# Patient Record
Sex: MALE | Race: BLACK OR AFRICAN AMERICAN | NOT HISPANIC OR LATINO | URBAN - METROPOLITAN AREA
[De-identification: names, ages, dates, MRNs, and addresses within clinical notes are randomized per-mention and may not be internally consistent; named-entity substitution may affect disease eponyms.]

---

## 2017-09-09 ENCOUNTER — EMERGENCY (EMERGENCY)
Facility: HOSPITAL | Age: 67
LOS: 0 days | Discharge: HOME | End: 2017-09-10
Attending: EMERGENCY MEDICINE
Payer: MEDICARE

## 2017-09-09 VITALS
WEIGHT: 198.42 LBS | DIASTOLIC BLOOD PRESSURE: 81 MMHG | TEMPERATURE: 98 F | RESPIRATION RATE: 20 BRPM | SYSTOLIC BLOOD PRESSURE: 129 MMHG | OXYGEN SATURATION: 97 % | HEART RATE: 18 BPM

## 2017-09-09 DIAGNOSIS — R06.02 SHORTNESS OF BREATH: ICD-10-CM

## 2017-09-09 DIAGNOSIS — R51 HEADACHE: ICD-10-CM

## 2017-09-09 DIAGNOSIS — I10 ESSENTIAL (PRIMARY) HYPERTENSION: ICD-10-CM

## 2017-09-09 DIAGNOSIS — R07.9 CHEST PAIN, UNSPECIFIED: ICD-10-CM

## 2017-09-09 LAB
ALBUMIN SERPL ELPH-MCNC: 3.7 G/DL — SIGNIFICANT CHANGE UP (ref 3.3–5)
ALP SERPL-CCNC: 52 U/L — SIGNIFICANT CHANGE UP (ref 40–120)
ALT FLD-CCNC: 23 U/L — SIGNIFICANT CHANGE UP (ref 12–78)
ANION GAP SERPL CALC-SCNC: 8 MMOL/L — SIGNIFICANT CHANGE UP (ref 5–17)
APPEARANCE UR: CLEAR — SIGNIFICANT CHANGE UP
APTT BLD: 30.4 SEC — SIGNIFICANT CHANGE UP (ref 27.5–37.4)
AST SERPL-CCNC: 13 U/L — LOW (ref 15–37)
BASOPHILS # BLD AUTO: 0.1 K/UL — SIGNIFICANT CHANGE UP (ref 0–0.2)
BASOPHILS NFR BLD AUTO: 1.1 % — SIGNIFICANT CHANGE UP (ref 0–2)
BILIRUB SERPL-MCNC: 0.4 MG/DL — SIGNIFICANT CHANGE UP (ref 0.2–1.2)
BILIRUB UR-MCNC: NEGATIVE — SIGNIFICANT CHANGE UP
BUN SERPL-MCNC: 17 MG/DL — SIGNIFICANT CHANGE UP (ref 7–23)
CALCIUM SERPL-MCNC: 8.8 MG/DL — SIGNIFICANT CHANGE UP (ref 8.5–10.1)
CHLORIDE SERPL-SCNC: 104 MMOL/L — SIGNIFICANT CHANGE UP (ref 96–108)
CK MB BLD-MCNC: 0.3 % — SIGNIFICANT CHANGE UP (ref 0–3.5)
CK MB BLD-MCNC: <0.3 % — SIGNIFICANT CHANGE UP (ref 0–3.5)
CK MB CFR SERPL CALC: 0.5 NG/ML — SIGNIFICANT CHANGE UP (ref 0.5–3.6)
CK MB CFR SERPL CALC: <0.5 NG/ML — SIGNIFICANT CHANGE UP (ref 0.5–3.6)
CK SERPL-CCNC: 149 U/L — SIGNIFICANT CHANGE UP (ref 26–308)
CK SERPL-CCNC: 163 U/L — SIGNIFICANT CHANGE UP (ref 26–308)
CO2 SERPL-SCNC: 30 MMOL/L — SIGNIFICANT CHANGE UP (ref 22–31)
COLOR SPEC: YELLOW — SIGNIFICANT CHANGE UP
CREAT SERPL-MCNC: 1.44 MG/DL — HIGH (ref 0.5–1.3)
D DIMER BLD IA.RAPID-MCNC: 275 NG/ML DDU — HIGH
DIFF PNL FLD: NEGATIVE — SIGNIFICANT CHANGE UP
EOSINOPHIL # BLD AUTO: 0 K/UL — SIGNIFICANT CHANGE UP (ref 0–0.5)
EOSINOPHIL NFR BLD AUTO: 0.2 % — SIGNIFICANT CHANGE UP (ref 0–6)
GLUCOSE SERPL-MCNC: 144 MG/DL — HIGH (ref 70–99)
GLUCOSE UR QL: 50 MG/DL
HCT VFR BLD CALC: 45.4 % — SIGNIFICANT CHANGE UP (ref 39–50)
HGB BLD-MCNC: 15.5 G/DL — SIGNIFICANT CHANGE UP (ref 13–17)
INR BLD: 0.88 RATIO — SIGNIFICANT CHANGE UP (ref 0.88–1.16)
KETONES UR-MCNC: NEGATIVE — SIGNIFICANT CHANGE UP
LEUKOCYTE ESTERASE UR-ACNC: NEGATIVE — SIGNIFICANT CHANGE UP
LYMPHOCYTES # BLD AUTO: 0.7 K/UL — LOW (ref 1–3.3)
LYMPHOCYTES # BLD AUTO: 12.8 % — LOW (ref 13–44)
MCHC RBC-ENTMCNC: 32.8 PG — SIGNIFICANT CHANGE UP (ref 27–34)
MCHC RBC-ENTMCNC: 34.1 GM/DL — SIGNIFICANT CHANGE UP (ref 32–36)
MCV RBC AUTO: 96.2 FL — SIGNIFICANT CHANGE UP (ref 80–100)
MONOCYTES # BLD AUTO: 0.3 K/UL — SIGNIFICANT CHANGE UP (ref 0–0.9)
MONOCYTES NFR BLD AUTO: 5.7 % — SIGNIFICANT CHANGE UP (ref 2–14)
NEUTROPHILS # BLD AUTO: 4.6 K/UL — SIGNIFICANT CHANGE UP (ref 1.8–7.4)
NEUTROPHILS NFR BLD AUTO: 80.1 % — HIGH (ref 43–77)
NITRITE UR-MCNC: NEGATIVE — SIGNIFICANT CHANGE UP
PH UR: 6 — SIGNIFICANT CHANGE UP (ref 5–8)
PLATELET # BLD AUTO: 222 K/UL — SIGNIFICANT CHANGE UP (ref 150–400)
POTASSIUM SERPL-MCNC: 3.5 MMOL/L — SIGNIFICANT CHANGE UP (ref 3.5–5.3)
POTASSIUM SERPL-SCNC: 3.5 MMOL/L — SIGNIFICANT CHANGE UP (ref 3.5–5.3)
PROT SERPL-MCNC: 7.7 GM/DL — SIGNIFICANT CHANGE UP (ref 6–8.3)
PROT UR-MCNC: NEGATIVE MG/DL — SIGNIFICANT CHANGE UP
PROTHROM AB SERPL-ACNC: 9.6 SEC — LOW (ref 9.8–12.7)
RBC # BLD: 4.72 M/UL — SIGNIFICANT CHANGE UP (ref 4.2–5.8)
RBC # FLD: 13.1 % — SIGNIFICANT CHANGE UP (ref 11–15)
SODIUM SERPL-SCNC: 142 MMOL/L — SIGNIFICANT CHANGE UP (ref 135–145)
SP GR SPEC: 1.01 — SIGNIFICANT CHANGE UP (ref 1.01–1.02)
TROPONIN I SERPL-MCNC: <.015 NG/ML — SIGNIFICANT CHANGE UP (ref 0.01–0.04)
TROPONIN I SERPL-MCNC: <.015 NG/ML — SIGNIFICANT CHANGE UP (ref 0.01–0.04)
TSH SERPL-MCNC: 0.45 UIU/ML — SIGNIFICANT CHANGE UP (ref 0.36–3.74)
UROBILINOGEN FLD QL: NEGATIVE MG/DL — SIGNIFICANT CHANGE UP
WBC # BLD: 5.8 K/UL — SIGNIFICANT CHANGE UP (ref 3.8–10.5)
WBC # FLD AUTO: 5.8 K/UL — SIGNIFICANT CHANGE UP (ref 3.8–10.5)

## 2017-09-09 PROCEDURE — 70450 CT HEAD/BRAIN W/O DYE: CPT | Mod: 26

## 2017-09-09 PROCEDURE — 99285 EMERGENCY DEPT VISIT HI MDM: CPT

## 2017-09-09 PROCEDURE — 71275 CT ANGIOGRAPHY CHEST: CPT | Mod: 26

## 2017-09-09 PROCEDURE — 71010: CPT | Mod: 26

## 2017-09-09 RX ORDER — SODIUM CHLORIDE 9 MG/ML
3 INJECTION INTRAMUSCULAR; INTRAVENOUS; SUBCUTANEOUS ONCE
Qty: 0 | Refills: 0 | Status: COMPLETED | OUTPATIENT
Start: 2017-09-09 | End: 2017-09-09

## 2017-09-09 RX ORDER — SODIUM CHLORIDE 9 MG/ML
1000 INJECTION INTRAMUSCULAR; INTRAVENOUS; SUBCUTANEOUS ONCE
Qty: 0 | Refills: 0 | Status: COMPLETED | OUTPATIENT
Start: 2017-09-09 | End: 2017-09-09

## 2017-09-09 RX ADMIN — SODIUM CHLORIDE 1000 MILLILITER(S): 9 INJECTION INTRAMUSCULAR; INTRAVENOUS; SUBCUTANEOUS at 19:20

## 2017-09-09 RX ADMIN — SODIUM CHLORIDE 3 MILLILITER(S): 9 INJECTION INTRAMUSCULAR; INTRAVENOUS; SUBCUTANEOUS at 16:46

## 2017-09-09 NOTE — ED PROVIDER NOTE - MEDICAL DECISION MAKING DETAILS
Pending repeat CE, TSH. Patient signed out to incoming physician.  All decisions regarding the progression of care will be made at their discretion.

## 2017-09-09 NOTE — ED PROVIDER NOTE - PROGRESS NOTE DETAILS
pt now a lot more alert and oriented,s tates he didn't sleep. denies cp now. Pt AOx4. hungry. well appearing and neurologically intact. Pt endorsed by Dr. Shirley, present for eval of CP.  Pending CTA & repeat CE.  Pt VSS in NAD awaiting tests. CE & CTA WNL.  Discussed results and outcome of testing with the patient, given copy as well.  Patient advised to please follow up with their primary care doctor within the next 24 hours and return to the Emergency Department for worsening symptoms or any other concerns.  Patient advised that their doctor may call  to follow up on the specific results of the tests performed today in the emergency department.

## 2017-09-09 NOTE — ED PROVIDER NOTE - PHYSICAL EXAMINATION
Gen: Alert, Well appearing. NAD    Head: NC, AT, PERRL, EOMI, normal lids/conjunctiva   ENT: Bilateral TM WNL, normal hearing, patent oropharynx without erythema/exudate, uvula midline  Neck: supple, no tenderness/meningismus/JVD   Pulm: Bilateral clear BS, normal resp effort, no wheeze/stridor/retractions  CV: RRR, no M/R/G, +dist pulses   Abd: soft, NT/ND, +BS, no guarding/rebound tenderness  Mskel: no edema/erythema/cyanosis   Skin: no rash   Neuro: AAOx3, no sensory/motor deficits. CN2-12 intact, No pronator drift. Normal finger to nose, heel to shin. No dysdiadochokinesis. Full power and sensation intact. Gait WNL.

## 2017-09-09 NOTE — ED ADULT NURSE NOTE - NS ED NURSE DC INFO COMPLEXITY
Patient asked questions/Verbalized Understanding Patient asked questions/Simple: Patient demonstrates quick and easy understanding/Verbalized Understanding

## 2017-09-09 NOTE — ED ADULT NURSE NOTE - CAS TRG GENERAL NORM CIRC DET
No visible significant external bleeding/Strong peripheral pulses/Capillary refill less/equal to 2 seconds

## 2017-09-09 NOTE — ED PROVIDER NOTE - OBJECTIVE STATEMENT
68yo male with pmh HTN, presents with left sided CP since last night and sob. Pt is AOx3, but repetitive questioning to answer, pt appears sleepy. Pt reports he just didn't feel well.     ROS: No fever/chills. No photophobia/eye pain/changes in vision, No ear pain/sore throat/dysphagia, + chest pain, no palpitations.  + SOB, no cough/stridor. No abdominal pain, N/V/D, no black/bloody bm. No dysuria/frequency/discharge, No headache. No Dizziness.  No rash.  No numbness/tingling/weakness. 68yo male with pmh HTN, presents with left sided CP since last night and sob. Pt is AOx3, but repetitive questioning to answer, pt appears sleepy. Pt reports he just didn't feel well. Pt is however AOx4 and following commands.     ROS: No fever/chills. No photophobia/eye pain/changes in vision, No ear pain/sore throat/dysphagia, + chest pain, no palpitations.  + SOB, no cough/stridor. No abdominal pain, N/V/D, no black/bloody bm. No dysuria/frequency/discharge, No headache. No Dizziness.  No rash.  No numbness/tingling/weakness.

## 2017-09-10 VITALS
TEMPERATURE: 98 F | OXYGEN SATURATION: 98 % | RESPIRATION RATE: 18 BRPM | DIASTOLIC BLOOD PRESSURE: 88 MMHG | HEART RATE: 74 BPM | SYSTOLIC BLOOD PRESSURE: 127 MMHG

## 2017-09-10 LAB
CULTURE RESULTS: NO GROWTH — SIGNIFICANT CHANGE UP
SPECIMEN SOURCE: SIGNIFICANT CHANGE UP

## 2017-09-10 PROCEDURE — 93010 ELECTROCARDIOGRAM REPORT: CPT

## 2017-11-30 ENCOUNTER — INPATIENT (INPATIENT)
Facility: HOSPITAL | Age: 67
LOS: 4 days | Discharge: HOME | End: 2017-12-05
Attending: INTERNAL MEDICINE | Admitting: INTERNAL MEDICINE
Payer: MEDICARE

## 2017-11-30 VITALS
DIASTOLIC BLOOD PRESSURE: 96 MMHG | OXYGEN SATURATION: 99 % | TEMPERATURE: 97 F | HEART RATE: 46 BPM | SYSTOLIC BLOOD PRESSURE: 146 MMHG | RESPIRATION RATE: 16 BRPM

## 2017-11-30 DIAGNOSIS — R07.9 CHEST PAIN, UNSPECIFIED: ICD-10-CM

## 2017-11-30 DIAGNOSIS — Z29.9 ENCOUNTER FOR PROPHYLACTIC MEASURES, UNSPECIFIED: ICD-10-CM

## 2017-11-30 DIAGNOSIS — N40.0 BENIGN PROSTATIC HYPERPLASIA WITHOUT LOWER URINARY TRACT SYMPTOMS: ICD-10-CM

## 2017-11-30 DIAGNOSIS — I10 ESSENTIAL (PRIMARY) HYPERTENSION: ICD-10-CM

## 2017-11-30 DIAGNOSIS — R79.89 OTHER SPECIFIED ABNORMAL FINDINGS OF BLOOD CHEMISTRY: ICD-10-CM

## 2017-11-30 DIAGNOSIS — R00.1 BRADYCARDIA, UNSPECIFIED: ICD-10-CM

## 2017-11-30 LAB
ALBUMIN SERPL ELPH-MCNC: 3.9 G/DL — SIGNIFICANT CHANGE UP (ref 3.3–5)
ALP SERPL-CCNC: 34 U/L — LOW (ref 40–120)
ALT FLD-CCNC: 21 U/L — SIGNIFICANT CHANGE UP (ref 4–41)
AST SERPL-CCNC: 30 U/L — SIGNIFICANT CHANGE UP (ref 4–40)
BASOPHILS # BLD AUTO: 0.02 K/UL — SIGNIFICANT CHANGE UP (ref 0–0.2)
BASOPHILS NFR BLD AUTO: 0.5 % — SIGNIFICANT CHANGE UP (ref 0–2)
BILIRUB SERPL-MCNC: 0.3 MG/DL — SIGNIFICANT CHANGE UP (ref 0.2–1.2)
BUN SERPL-MCNC: 18 MG/DL — SIGNIFICANT CHANGE UP (ref 7–23)
CALCIUM SERPL-MCNC: 8.4 MG/DL — SIGNIFICANT CHANGE UP (ref 8.4–10.5)
CHLORIDE SERPL-SCNC: 105 MMOL/L — SIGNIFICANT CHANGE UP (ref 98–107)
CK MB BLD-MCNC: 1 NG/ML — SIGNIFICANT CHANGE UP (ref 1–6.6)
CK MB BLD-MCNC: 1 NG/ML — SIGNIFICANT CHANGE UP (ref 1–6.6)
CK MB BLD-MCNC: SIGNIFICANT CHANGE UP (ref 0–2.5)
CK MB BLD-MCNC: SIGNIFICANT CHANGE UP (ref 0–2.5)
CK SERPL-CCNC: 117 U/L — SIGNIFICANT CHANGE UP (ref 30–200)
CK SERPL-CCNC: 128 U/L — SIGNIFICANT CHANGE UP (ref 30–200)
CO2 SERPL-SCNC: 23 MMOL/L — SIGNIFICANT CHANGE UP (ref 22–31)
CREAT SERPL-MCNC: 1.39 MG/DL — HIGH (ref 0.5–1.3)
EOSINOPHIL # BLD AUTO: 0.05 K/UL — SIGNIFICANT CHANGE UP (ref 0–0.5)
EOSINOPHIL NFR BLD AUTO: 1.3 % — SIGNIFICANT CHANGE UP (ref 0–6)
GLUCOSE SERPL-MCNC: 93 MG/DL — SIGNIFICANT CHANGE UP (ref 70–99)
HCT VFR BLD CALC: 41.5 % — SIGNIFICANT CHANGE UP (ref 39–50)
HGB BLD-MCNC: 14.4 G/DL — SIGNIFICANT CHANGE UP (ref 13–17)
IMM GRANULOCYTES # BLD AUTO: 0.01 # — SIGNIFICANT CHANGE UP
IMM GRANULOCYTES NFR BLD AUTO: 0.3 % — SIGNIFICANT CHANGE UP (ref 0–1.5)
LYMPHOCYTES # BLD AUTO: 1.19 K/UL — SIGNIFICANT CHANGE UP (ref 1–3.3)
LYMPHOCYTES # BLD AUTO: 30 % — SIGNIFICANT CHANGE UP (ref 13–44)
MCHC RBC-ENTMCNC: 32.8 PG — SIGNIFICANT CHANGE UP (ref 27–34)
MCHC RBC-ENTMCNC: 34.7 % — SIGNIFICANT CHANGE UP (ref 32–36)
MCV RBC AUTO: 94.5 FL — SIGNIFICANT CHANGE UP (ref 80–100)
MONOCYTES # BLD AUTO: 0.32 K/UL — SIGNIFICANT CHANGE UP (ref 0–0.9)
MONOCYTES NFR BLD AUTO: 8.1 % — SIGNIFICANT CHANGE UP (ref 2–14)
NEUTROPHILS # BLD AUTO: 2.38 K/UL — SIGNIFICANT CHANGE UP (ref 1.8–7.4)
NEUTROPHILS NFR BLD AUTO: 59.8 % — SIGNIFICANT CHANGE UP (ref 43–77)
NRBC # FLD: 0 — SIGNIFICANT CHANGE UP
PLATELET # BLD AUTO: 194 K/UL — SIGNIFICANT CHANGE UP (ref 150–400)
PMV BLD: 9.2 FL — SIGNIFICANT CHANGE UP (ref 7–13)
POTASSIUM SERPL-MCNC: 5 MMOL/L — SIGNIFICANT CHANGE UP (ref 3.5–5.3)
POTASSIUM SERPL-SCNC: 5 MMOL/L — SIGNIFICANT CHANGE UP (ref 3.5–5.3)
PROT SERPL-MCNC: 6.9 G/DL — SIGNIFICANT CHANGE UP (ref 6–8.3)
RBC # BLD: 4.39 M/UL — SIGNIFICANT CHANGE UP (ref 4.2–5.8)
RBC # FLD: 13.2 % — SIGNIFICANT CHANGE UP (ref 10.3–14.5)
SODIUM SERPL-SCNC: 141 MMOL/L — SIGNIFICANT CHANGE UP (ref 135–145)
TROPONIN T SERPL-MCNC: < 0.06 NG/ML — SIGNIFICANT CHANGE UP (ref 0–0.06)
TROPONIN T SERPL-MCNC: < 0.06 NG/ML — SIGNIFICANT CHANGE UP (ref 0–0.06)
WBC # BLD: 3.97 K/UL — SIGNIFICANT CHANGE UP (ref 3.8–10.5)
WBC # FLD AUTO: 3.97 K/UL — SIGNIFICANT CHANGE UP (ref 3.8–10.5)

## 2017-11-30 PROCEDURE — 71020: CPT | Mod: 26

## 2017-11-30 RX ORDER — INFLUENZA VIRUS VACCINE 15; 15; 15; 15 UG/.5ML; UG/.5ML; UG/.5ML; UG/.5ML
0.5 SUSPENSION INTRAMUSCULAR ONCE
Qty: 0 | Refills: 0 | Status: DISCONTINUED | OUTPATIENT
Start: 2017-11-30 | End: 2017-12-05

## 2017-11-30 RX ORDER — ASPIRIN/CALCIUM CARB/MAGNESIUM 324 MG
81 TABLET ORAL DAILY
Qty: 0 | Refills: 0 | Status: DISCONTINUED | OUTPATIENT
Start: 2017-11-30 | End: 2017-12-05

## 2017-11-30 RX ORDER — AMLODIPINE BESYLATE 2.5 MG/1
10 TABLET ORAL DAILY
Qty: 0 | Refills: 0 | Status: DISCONTINUED | OUTPATIENT
Start: 2017-11-30 | End: 2017-12-05

## 2017-11-30 RX ORDER — SODIUM CHLORIDE 9 MG/ML
3 INJECTION INTRAMUSCULAR; INTRAVENOUS; SUBCUTANEOUS EVERY 8 HOURS
Qty: 0 | Refills: 0 | Status: DISCONTINUED | OUTPATIENT
Start: 2017-11-30 | End: 2017-12-05

## 2017-11-30 RX ORDER — GABAPENTIN 400 MG/1
100 CAPSULE ORAL THREE TIMES A DAY
Qty: 0 | Refills: 0 | Status: DISCONTINUED | OUTPATIENT
Start: 2017-11-30 | End: 2017-12-05

## 2017-11-30 RX ORDER — HYDROCHLOROTHIAZIDE 25 MG
12.5 TABLET ORAL DAILY
Qty: 0 | Refills: 0 | Status: DISCONTINUED | OUTPATIENT
Start: 2017-11-30 | End: 2017-12-05

## 2017-11-30 RX ORDER — HYDRALAZINE HCL 50 MG
50 TABLET ORAL EVERY 8 HOURS
Qty: 0 | Refills: 0 | Status: DISCONTINUED | OUTPATIENT
Start: 2017-11-30 | End: 2017-12-05

## 2017-11-30 RX ORDER — TAMSULOSIN HYDROCHLORIDE 0.4 MG/1
0.4 CAPSULE ORAL AT BEDTIME
Qty: 0 | Refills: 0 | Status: DISCONTINUED | OUTPATIENT
Start: 2017-11-30 | End: 2017-12-05

## 2017-11-30 RX ORDER — HEPARIN SODIUM 5000 [USP'U]/ML
5000 INJECTION INTRAVENOUS; SUBCUTANEOUS EVERY 12 HOURS
Qty: 0 | Refills: 0 | Status: DISCONTINUED | OUTPATIENT
Start: 2017-11-30 | End: 2017-12-05

## 2017-11-30 RX ORDER — ACETAMINOPHEN 500 MG
650 TABLET ORAL ONCE
Qty: 0 | Refills: 0 | Status: COMPLETED | OUTPATIENT
Start: 2017-11-30 | End: 2017-11-30

## 2017-11-30 RX ADMIN — SODIUM CHLORIDE 3 MILLILITER(S): 9 INJECTION INTRAMUSCULAR; INTRAVENOUS; SUBCUTANEOUS at 21:16

## 2017-11-30 RX ADMIN — Medication 650 MILLIGRAM(S): at 17:02

## 2017-11-30 RX ADMIN — TAMSULOSIN HYDROCHLORIDE 0.4 MILLIGRAM(S): 0.4 CAPSULE ORAL at 21:27

## 2017-11-30 RX ADMIN — GABAPENTIN 100 MILLIGRAM(S): 400 CAPSULE ORAL at 21:23

## 2017-11-30 RX ADMIN — Medication 50 MILLIGRAM(S): at 21:24

## 2017-11-30 RX ADMIN — HEPARIN SODIUM 5000 UNIT(S): 5000 INJECTION INTRAVENOUS; SUBCUTANEOUS at 21:27

## 2017-11-30 NOTE — ED PROVIDER NOTE - MEDICAL DECISION MAKING DETAILS
66yo M h/o HTN, high chol p/w L sided CP and R arm/neck pain/numbness. R/o ACS with labs, ekg, cxr, reassess. Will likely need admission for cardiac workup.

## 2017-11-30 NOTE — ED PROVIDER NOTE - NS ED ROS FT
Constitutional: no fevers, chills  HEENT: no visual changes, no sore throat, no rhinorrhea  CV: + cp  Resp: + sob  GI: no abd pain, n/v, diarrhea/constipation  : no dysuria, hematuria  MSK: +R arm pain  skin: no rashes  neuro: no HA, no confusion  psych: no SI/HI

## 2017-11-30 NOTE — H&P ADULT - NSHPLABSRESULTS_GEN_ALL_CORE
CXR preliminary = clear  SB @ 48b/ min, TWI 1,2, AVL, V5 V6  H & H = 14.4/ 41.5  WBC= 3.9  CE negative x 1  BUN/ Creatinine= 18/ 1.39

## 2017-11-30 NOTE — H&P ADULT - HISTORY OF PRESENT ILLNESS
67M with a history of HTN, dyslipidemia, BPH, experiencing non exertional, intermittent L sided CP that radiates to the R arm, neck pain. The chest pain episodes last about 10 minutes, is worse with ambulation, improves with aspirin.  Positive SOB. Denies diaphoresis, chills, nausea, vomit, dizziness, falls, slurring of words, facial droop, weakness.  In the Ed, the pt is found to have a heart rat e in the 40's. Th ept was placed on a Zoll and is currently chest pain free at this time.

## 2017-11-30 NOTE — ED ADULT TRIAGE NOTE - CHIEF COMPLAINT QUOTE
Pt bibems from [facility], pt c/o chest pain, right sided numbness that started yesterday, hx of DM, HTN, HLD. pt bradycardic to 46

## 2017-11-30 NOTE — ED ADULT NURSE NOTE - PMH
Benign nodular prostatic hyperplasia without lower urinary tract symptoms    Essential hypertension    Muscle cramps  (on gabapentin)

## 2017-11-30 NOTE — H&P ADULT - RS GEN PE MLT RESP DETAILS PC
breath sounds equal/good air movement/no rales/respirations non-labored/clear to auscultation bilaterally/no chest wall tenderness/airway patent/no intercostal retractions/no wheezes/no rhonchi/no subcutaneous emphysema

## 2017-11-30 NOTE — ED PROVIDER NOTE - ATTENDING CONTRIBUTION TO CARE
AJM: 66yo M h/o HTN, high chol p/w L sided CP and R arm/neck pain/numbness. CP started 3d ago, worse with ambulation, improves with aspirin. CP waxes and wanes, episodes last about 10 minutes. With associated shortness of breath. Pt notes his doctor wanted him to see cardiologist for catch but he never followed up. Pt notes this pain is more severe than he has had in the past. Concern for acs given exertional nature of symptoms and worsening symptoms. acs workup and admit to tele. asa.

## 2017-11-30 NOTE — ED PROVIDER NOTE - PHYSICAL EXAMINATION
Vitals: WNL  Gen: laying comfortably in NAD  Head: NCAT  ENT: sclerae white, anicterus, moist mucous membranes. No exudates. Neck supple, no LAD,  no carotid bruits, no JVD  CV: RRR. Audible S1 and S2. No murmurs, rubs, gallops, S3, nor S4, 2+ radial and DP pulses   Pulm: Clear to auscultation bilaterally. No wheezes, rales, or rhonchi  Abd: soft, normoactive BS x4, NTND, no rebound, no guarding, no rashes  Musculoskeletal:  No peripheral edema  Skin: no lesions or scars noted  Neurologic: AAOx3  : no CVA tenderness  Psych: no SI/HI Vitals: WNL  Gen: laying comfortably in NAD  Head: NCAT  ENT: sclerae white, anicterus, moist mucous membranes. No exudates. Neck supple  CV: RRR. Audible S1 and S2. No murmurs, rubs, gallops, S3, nor S4, 2+ radial and DP pulses   Pulm: Clear to auscultation bilaterally. No wheezes, rales, or rhonchi  Abd: soft, normoactive BS x4, NTND, no rebound, no guarding, no rashes  Musculoskeletal:  No peripheral edema  Skin: no lesions or scars noted  Neurologic: AAOx3  : no CVA tenderness  Psych: no SI/HI Vitals: afebrile, el, remainder vitals wnl  Gen: laying comfortably in NAD  Head: NCAT  ENT: sclerae white, anicterus, moist mucous membranes. No exudates. Neck supple  CV: RRR. Audible S1 and S2. No murmurs, rubs, gallops, S3, nor S4, 2+ radial and DP pulses   Pulm: Clear to auscultation bilaterally. No wheezes, rales, or rhonchi  Abd: soft, normoactive BS x4, NTND, no rebound, no guarding, no rashes  Musculoskeletal:  No peripheral edema  Skin: no lesions or scars noted  Neurologic: AAOx3  : no CVA tenderness

## 2017-11-30 NOTE — H&P ADULT - NEGATIVE OPHTHALMOLOGIC SYMPTOMS
no photophobia/no blurred vision L/no discharge R/no blurred vision R/no lacrimation L/no lacrimation R/no discharge L

## 2017-11-30 NOTE — H&P ADULT - NEGATIVE NEUROLOGICAL SYMPTOMS
no syncope/no focal seizures/no paresthesias/no transient paralysis/no generalized seizures/no weakness

## 2017-11-30 NOTE — ED ADULT NURSE NOTE - CHIEF COMPLAINT QUOTE
Pt bibems from [facility], pt c/o chest pain, right sided numbness that started yesterday, hx of DM, HTN, HLD. pt bradycardic to 46

## 2017-11-30 NOTE — H&P ADULT - NEGATIVE ENMT SYMPTOMS
no tinnitus/no vertigo/no sinus symptoms/no ear pain/no nasal congestion/no nasal obstruction/no nasal discharge/no post-nasal discharge

## 2017-11-30 NOTE — ED ADULT NURSE NOTE - OBJECTIVE STATEMENT
Receive pt in spot 14 alert and oriented x 3  c/o chest pain, more on exertion, nausea, dizziness and  R arm/neck pain since x 3 days, worse today.  Denies palpitation, chills, cough, diarrhea.  Pt sinus el 50's on cardiac monitoring.  VS see charting.  Skin intact.  Lungs clear.  IV placed.  LAbs sent

## 2017-11-30 NOTE — ED PROVIDER NOTE - OBJECTIVE STATEMENT
66yo F pmh HTN, high chol p/w L sided CP x3d and R arm pain x1d. CP worse with ambulation, improves with aspirin. CP waxes and wanes, episodes last about 10 minutes. With associated shortness of breath. Had CP prior to EMS arrival. Given asa 162 en route and CP resolved. Denies fever, cough, trauma to the area, n/v. Also complaining of R arm and neck pain that started yesterday. + numbness and tingling of R fingers. Denies slurring of words, facial droop, weakness. 68yo M h/o HTN, high chol p/w L sided CP and R arm/neck pain/numbness. CP started 3d ago, worse with ambulation, improves with aspirin. CP waxes and wanes, episodes last about 10 minutes. With associated shortness of breath. Had CP prior to EMS arrival. Given asa 162 en route and CP resolved. Denies fever, cough, trauma to the area, n/v. Also complaining of R arm and neck pain that started yesterday. + numbness and tingling of R fingers. Denies slurring of words, facial droop, weakness.

## 2017-12-01 LAB
BUN SERPL-MCNC: 22 MG/DL — SIGNIFICANT CHANGE UP (ref 7–23)
CALCIUM SERPL-MCNC: 9.2 MG/DL — SIGNIFICANT CHANGE UP (ref 8.4–10.5)
CHLORIDE SERPL-SCNC: 105 MMOL/L — SIGNIFICANT CHANGE UP (ref 98–107)
CHOLEST SERPL-MCNC: 185 MG/DL — SIGNIFICANT CHANGE UP (ref 120–199)
CO2 SERPL-SCNC: 24 MMOL/L — SIGNIFICANT CHANGE UP (ref 22–31)
CREAT SERPL-MCNC: 1.31 MG/DL — HIGH (ref 0.5–1.3)
GLUCOSE BLDC GLUCOMTR-MCNC: 107 MG/DL — HIGH (ref 70–99)
GLUCOSE BLDC GLUCOMTR-MCNC: 126 MG/DL — HIGH (ref 70–99)
GLUCOSE BLDC GLUCOMTR-MCNC: 97 MG/DL — SIGNIFICANT CHANGE UP (ref 70–99)
GLUCOSE SERPL-MCNC: 142 MG/DL — HIGH (ref 70–99)
HCT VFR BLD CALC: 42.5 % — SIGNIFICANT CHANGE UP (ref 39–50)
HDLC SERPL-MCNC: 68 MG/DL — HIGH (ref 35–55)
HGB BLD-MCNC: 14.3 G/DL — SIGNIFICANT CHANGE UP (ref 13–17)
LIPID PNL WITH DIRECT LDL SERPL: 121 MG/DL — SIGNIFICANT CHANGE UP
MAGNESIUM SERPL-MCNC: 2.2 MG/DL — SIGNIFICANT CHANGE UP (ref 1.6–2.6)
MCHC RBC-ENTMCNC: 31.6 PG — SIGNIFICANT CHANGE UP (ref 27–34)
MCHC RBC-ENTMCNC: 33.6 % — SIGNIFICANT CHANGE UP (ref 32–36)
MCV RBC AUTO: 93.8 FL — SIGNIFICANT CHANGE UP (ref 80–100)
NRBC # FLD: 0 — SIGNIFICANT CHANGE UP
PHOSPHATE SERPL-MCNC: 2.8 MG/DL — SIGNIFICANT CHANGE UP (ref 2.5–4.5)
PLATELET # BLD AUTO: 196 K/UL — SIGNIFICANT CHANGE UP (ref 150–400)
PMV BLD: 9.3 FL — SIGNIFICANT CHANGE UP (ref 7–13)
POTASSIUM SERPL-MCNC: 3.8 MMOL/L — SIGNIFICANT CHANGE UP (ref 3.5–5.3)
POTASSIUM SERPL-SCNC: 3.8 MMOL/L — SIGNIFICANT CHANGE UP (ref 3.5–5.3)
RBC # BLD: 4.53 M/UL — SIGNIFICANT CHANGE UP (ref 4.2–5.8)
RBC # FLD: 13.2 % — SIGNIFICANT CHANGE UP (ref 10.3–14.5)
SODIUM SERPL-SCNC: 142 MMOL/L — SIGNIFICANT CHANGE UP (ref 135–145)
TRIGL SERPL-MCNC: 89 MG/DL — SIGNIFICANT CHANGE UP (ref 10–149)
TSH SERPL-MCNC: 0.95 UIU/ML — SIGNIFICANT CHANGE UP (ref 0.27–4.2)
WBC # BLD: 4.48 K/UL — SIGNIFICANT CHANGE UP (ref 3.8–10.5)
WBC # FLD AUTO: 4.48 K/UL — SIGNIFICANT CHANGE UP (ref 3.8–10.5)

## 2017-12-01 PROCEDURE — 78452 HT MUSCLE IMAGE SPECT MULT: CPT | Mod: 26

## 2017-12-01 PROCEDURE — 93016 CV STRESS TEST SUPVJ ONLY: CPT | Mod: GC

## 2017-12-01 PROCEDURE — 93018 CV STRESS TEST I&R ONLY: CPT | Mod: GC

## 2017-12-01 PROCEDURE — 93306 TTE W/DOPPLER COMPLETE: CPT | Mod: 26

## 2017-12-01 RX ORDER — SODIUM CHLORIDE 9 MG/ML
1000 INJECTION INTRAMUSCULAR; INTRAVENOUS; SUBCUTANEOUS
Qty: 0 | Refills: 0 | Status: DISCONTINUED | OUTPATIENT
Start: 2017-12-01 | End: 2017-12-05

## 2017-12-01 RX ADMIN — AMLODIPINE BESYLATE 10 MILLIGRAM(S): 2.5 TABLET ORAL at 05:12

## 2017-12-01 RX ADMIN — GABAPENTIN 100 MILLIGRAM(S): 400 CAPSULE ORAL at 05:12

## 2017-12-01 RX ADMIN — SODIUM CHLORIDE 3 MILLILITER(S): 9 INJECTION INTRAMUSCULAR; INTRAVENOUS; SUBCUTANEOUS at 05:13

## 2017-12-01 RX ADMIN — SODIUM CHLORIDE 75 MILLILITER(S): 9 INJECTION INTRAMUSCULAR; INTRAVENOUS; SUBCUTANEOUS at 18:25

## 2017-12-01 RX ADMIN — TAMSULOSIN HYDROCHLORIDE 0.4 MILLIGRAM(S): 0.4 CAPSULE ORAL at 21:16

## 2017-12-01 RX ADMIN — GABAPENTIN 100 MILLIGRAM(S): 400 CAPSULE ORAL at 21:16

## 2017-12-01 RX ADMIN — HEPARIN SODIUM 5000 UNIT(S): 5000 INJECTION INTRAVENOUS; SUBCUTANEOUS at 17:40

## 2017-12-01 RX ADMIN — GABAPENTIN 100 MILLIGRAM(S): 400 CAPSULE ORAL at 17:40

## 2017-12-01 RX ADMIN — Medication 50 MILLIGRAM(S): at 21:16

## 2017-12-01 RX ADMIN — SODIUM CHLORIDE 3 MILLILITER(S): 9 INJECTION INTRAMUSCULAR; INTRAVENOUS; SUBCUTANEOUS at 21:16

## 2017-12-01 RX ADMIN — HEPARIN SODIUM 5000 UNIT(S): 5000 INJECTION INTRAVENOUS; SUBCUTANEOUS at 05:12

## 2017-12-01 RX ADMIN — Medication 12.5 MILLIGRAM(S): at 05:12

## 2017-12-01 RX ADMIN — Medication 1 TABLET(S): at 17:40

## 2017-12-01 RX ADMIN — Medication 81 MILLIGRAM(S): at 17:40

## 2017-12-01 RX ADMIN — Medication 50 MILLIGRAM(S): at 05:12

## 2017-12-01 RX ADMIN — SODIUM CHLORIDE 3 MILLILITER(S): 9 INJECTION INTRAMUSCULAR; INTRAVENOUS; SUBCUTANEOUS at 18:25

## 2017-12-01 RX ADMIN — Medication 50 MILLIGRAM(S): at 17:40

## 2017-12-01 NOTE — PROGRESS NOTE ADULT - ASSESSMENT
Unstable Angina  Bradycardia    TTE and Pharm NST r/o ischemia  continue amlodipine and hydralazine  no AVN blockers due to bradycardia

## 2017-12-01 NOTE — PROGRESS NOTE ADULT - SUBJECTIVE AND OBJECTIVE BOX
S/  Pt with intermittent chest pain   No SOB or palpitations or dizziness    O/  Temperature  Temp (F): 98.9 Degrees F  Temp (C) Temp (C): 37.2 Degrees C  Temp site Temp Site: oral    Respiratory/Pulse Oximetry  Respiration Rate (breaths/min) Respiration Rate (breaths/min): 18 /min  SpO2 (%) SpO2 (%): 99 %  O2 delivery Patient On: room air    Orthostatic Blood Pressure/Pulse      Orthostatic Blood Pressure/Pulse  Orthostatic BP Lying Systolic/: 146 mm Hg  Orthostatic BP Diastolic (mm Hg): 94 mm Hg  Orthostatic Pulse Heart Rate (beats/min): 74 mm Hg  Orthostatic BP Sitting Systolic/: 146 mm Hg  Orthostatic BP Diastolic (mm Hg): 100 mm Hg  Orthostatic Pulse Heart Rate (beats/min): 85 mm Hg  Orthostatic BP Standing Systolic/: 125 mm Hg  Orthostatic BP Diastolic (mm Hg): 87 mm Hg  Orthostatic Pulse Heart Rate (beats/min): 94 mm Hg  Orthostatic BP/Pulse Site: upper right arm  Orthostatic BP/ Pulse Mode: electronic     Physical Exam:  · Constitutional	detailed exam	  · Constitutional Details	well-groomed; well-nourished	  · Eyes	detailed exam	  · Eyes Details	EOMI; conjunctiva clear	  · ENMT	detailed exam	  · ENMT Details	nose; mouth	  · Mouth	moist	  · Neck	detailed exam	  · Neck Details	supple	  · Breasts	detailed exam	  · Breasts Details	no mass; no tenderness	  · Back	detailed exam	  · Back Details	normal shape; ROM intact; strength intact	  · Respiratory	detailed exam	  · Respiratory Details	airway patent; breath sounds equal; good air movement; respirations non-labored; clear to auscultation bilaterally; no chest wall tenderness; no intercostal retractions; no rales; no rhonchi; no subcutaneous emphysema; no wheezes	  · Cardiovascular	detailed exam	  · Cardiovascular Details	regular rate and rhythm  no rub  no murmur	  · Gastrointestinal	detailed exam	  · GI Normal	soft; nontender; no distention; no masses palpable	  · Genitourinary	not examined 	  · Rectal	not examined 	  · Extremities	detailed exam 	  · Extremities Details	no clubbing; no cyanosis; no pedal edema	  · Vascular	detailed exam 	  · Radial Pulse	right normal; left normal	  · DP Pulse	right normal; left normal	  · PT Pulse	right normal; left normal	  · Neurological	detailed exam	  · Neurological Details	responds to verbal commands; sensation intact; no spontaneous movement	  · Skin	not examined	  · Lymph Nodes	not examined	  · Musculoskeletal	detailed exam	  · Musculoskeletal Details	no joint swelling; no joint erythema; no joint warmth; normal strength	  · Psychiatric	not examined	      Troponin negative x 2

## 2017-12-02 LAB
BUN SERPL-MCNC: 21 MG/DL — SIGNIFICANT CHANGE UP (ref 7–23)
CALCIUM SERPL-MCNC: 9.1 MG/DL — SIGNIFICANT CHANGE UP (ref 8.4–10.5)
CHLORIDE SERPL-SCNC: 102 MMOL/L — SIGNIFICANT CHANGE UP (ref 98–107)
CO2 SERPL-SCNC: 25 MMOL/L — SIGNIFICANT CHANGE UP (ref 22–31)
CREAT SERPL-MCNC: 1.44 MG/DL — HIGH (ref 0.5–1.3)
GLUCOSE SERPL-MCNC: 100 MG/DL — HIGH (ref 70–99)
HCT VFR BLD CALC: 44.8 % — SIGNIFICANT CHANGE UP (ref 39–50)
HGB BLD-MCNC: 15.1 G/DL — SIGNIFICANT CHANGE UP (ref 13–17)
MAGNESIUM SERPL-MCNC: 2.1 MG/DL — SIGNIFICANT CHANGE UP (ref 1.6–2.6)
MCHC RBC-ENTMCNC: 31.3 PG — SIGNIFICANT CHANGE UP (ref 27–34)
MCHC RBC-ENTMCNC: 33.7 % — SIGNIFICANT CHANGE UP (ref 32–36)
MCV RBC AUTO: 92.9 FL — SIGNIFICANT CHANGE UP (ref 80–100)
NRBC # FLD: 0 — SIGNIFICANT CHANGE UP
PLATELET # BLD AUTO: 208 K/UL — SIGNIFICANT CHANGE UP (ref 150–400)
PMV BLD: 9.5 FL — SIGNIFICANT CHANGE UP (ref 7–13)
POTASSIUM SERPL-MCNC: 3.3 MMOL/L — LOW (ref 3.5–5.3)
POTASSIUM SERPL-SCNC: 3.3 MMOL/L — LOW (ref 3.5–5.3)
RBC # BLD: 4.82 M/UL — SIGNIFICANT CHANGE UP (ref 4.2–5.8)
RBC # FLD: 13.5 % — SIGNIFICANT CHANGE UP (ref 10.3–14.5)
SODIUM SERPL-SCNC: 141 MMOL/L — SIGNIFICANT CHANGE UP (ref 135–145)
WBC # BLD: 4.96 K/UL — SIGNIFICANT CHANGE UP (ref 3.8–10.5)
WBC # FLD AUTO: 4.96 K/UL — SIGNIFICANT CHANGE UP (ref 3.8–10.5)

## 2017-12-02 PROCEDURE — 70496 CT ANGIOGRAPHY HEAD: CPT | Mod: 26

## 2017-12-02 PROCEDURE — 70498 CT ANGIOGRAPHY NECK: CPT | Mod: 26,52

## 2017-12-02 RX ORDER — ACETYLCYSTEINE 200 MG/ML
1200 VIAL (ML) MISCELLANEOUS
Qty: 0 | Refills: 0 | Status: COMPLETED | OUTPATIENT
Start: 2017-12-02 | End: 2017-12-04

## 2017-12-02 RX ORDER — POTASSIUM CHLORIDE 20 MEQ
40 PACKET (EA) ORAL ONCE
Qty: 0 | Refills: 0 | Status: COMPLETED | OUTPATIENT
Start: 2017-12-02 | End: 2017-12-02

## 2017-12-02 RX ADMIN — TAMSULOSIN HYDROCHLORIDE 0.4 MILLIGRAM(S): 0.4 CAPSULE ORAL at 21:52

## 2017-12-02 RX ADMIN — GABAPENTIN 100 MILLIGRAM(S): 400 CAPSULE ORAL at 21:52

## 2017-12-02 RX ADMIN — Medication 50 MILLIGRAM(S): at 12:09

## 2017-12-02 RX ADMIN — SODIUM CHLORIDE 3 MILLILITER(S): 9 INJECTION INTRAMUSCULAR; INTRAVENOUS; SUBCUTANEOUS at 21:53

## 2017-12-02 RX ADMIN — HEPARIN SODIUM 5000 UNIT(S): 5000 INJECTION INTRAVENOUS; SUBCUTANEOUS at 05:27

## 2017-12-02 RX ADMIN — AMLODIPINE BESYLATE 10 MILLIGRAM(S): 2.5 TABLET ORAL at 12:08

## 2017-12-02 RX ADMIN — GABAPENTIN 100 MILLIGRAM(S): 400 CAPSULE ORAL at 12:08

## 2017-12-02 RX ADMIN — Medication 81 MILLIGRAM(S): at 12:08

## 2017-12-02 RX ADMIN — GABAPENTIN 100 MILLIGRAM(S): 400 CAPSULE ORAL at 05:27

## 2017-12-02 RX ADMIN — SODIUM CHLORIDE 3 MILLILITER(S): 9 INJECTION INTRAMUSCULAR; INTRAVENOUS; SUBCUTANEOUS at 05:27

## 2017-12-02 RX ADMIN — Medication 1 TABLET(S): at 12:09

## 2017-12-02 RX ADMIN — SODIUM CHLORIDE 3 MILLILITER(S): 9 INJECTION INTRAMUSCULAR; INTRAVENOUS; SUBCUTANEOUS at 12:09

## 2017-12-02 RX ADMIN — Medication 50 MILLIGRAM(S): at 05:27

## 2017-12-02 RX ADMIN — Medication 40 MILLIEQUIVALENT(S): at 12:07

## 2017-12-02 RX ADMIN — Medication 50 MILLIGRAM(S): at 21:52

## 2017-12-02 RX ADMIN — HEPARIN SODIUM 5000 UNIT(S): 5000 INJECTION INTRAVENOUS; SUBCUTANEOUS at 17:17

## 2017-12-02 RX ADMIN — Medication 12.5 MILLIGRAM(S): at 05:27

## 2017-12-02 RX ADMIN — Medication 1200 MILLIGRAM(S): at 18:35

## 2017-12-02 NOTE — DISCHARGE NOTE ADULT - ADDITIONAL INSTRUCTIONS
follow up with your doctor in a week Dr Tam [address] [facility] follow up with your doctor in a week Dr Tam [address] [facility]   follow up with your doctor in 2 weeks with Dr Rothman [phone]  follow up with your doctor in 3 weeks with Dr Hdez [phone]

## 2017-12-02 NOTE — DISCHARGE NOTE ADULT - CARE PROVIDERS DIRECT ADDRESSES
[email or web address],DirectAddress_Unknown [email or web address],DirectAddress_Unknown,DirectAddress_Unknown

## 2017-12-02 NOTE — CONSULT NOTE ADULT - ATTENDING COMMENTS
pt co ha, r parietal, blurry vision. CTA neg for aneurysm/dissection. nonfocal neuro exam  plan  1. mri brain, C spine  2. check esr/crp

## 2017-12-02 NOTE — PROGRESS NOTE ADULT - ASSESSMENT
Atypical Chest pain MI ruled out / NST negative ---likely costocondritis   Dizziness     Tele NSR  ECHO and NST normal   negative orthostatic v/s    Neuro House evaluation for dizziness  PT evaluation     discharge if cleared by Neurology

## 2017-12-02 NOTE — DISCHARGE NOTE ADULT - PROVIDER TOKENS
TOKEN:'[id number]',FREE:[LAST:[Primary Care Physician],PHONE:[(   )    -],FAX:[(   )    -]] TOKEN:'[id number]',FREE:[LAST:[Dr Vanessa],FIRST:[PCP],PHONE:[(   )    -],FAX:[(   )    -],ADDRESS:[[address] [facility]]] TOKEN:'[id number]',FREE:[LAST:[Dr Vanessa],FIRST:[PCP],PHONE:[(   )    -],FAX:[(   )    -],ADDRESS:[[address] [facility]]],TOKEN:'[id number]'

## 2017-12-02 NOTE — CONSULT NOTE ADULT - CONSULT REASON
right parietal headache w/ dizziness, new since admission right parietal headache w/ light-headedness, new since admission

## 2017-12-02 NOTE — DISCHARGE NOTE ADULT - CARE PROVIDER_API CALL
Germán Hdez), Cardiovascular Disease; Internal Medicine  [address] [place] [address]  Phone: [phone]  Fax: [phone]    Primary Care Physician,   Phone: (   )    -  Fax: (   )    - Germán Hdez (MD), Cardiovascular Disease; Internal Medicine  [address] [place] [address]  Phone: [phone]  Fax: [phone]    Dr Vanessa, PCP  [address] [facility]  Phone: (   )    -  Fax: (   )    - Germán Hdez (MD), Cardiovascular Disease; Internal Medicine  [address] [place] [address]  Phone: [phone]  Fax: [phone]    Dr Vanessa, PCP  [address] [facility]  Phone: (   )    -  Fax: (   )    -    Gera Rothman (DO), Neurology  [address] [place] [address]  Phone: [phone]  Fax: [phone]

## 2017-12-02 NOTE — CONSULT NOTE ADULT - PROBLEM SELECTOR RECOMMENDATION 9
headache in the setting of right MSK chest and neck pain. headache in the setting of right MSK chest and neck pain. Patient notes pain is positional.   Plan:   -CTH   -CTA H/N (r/o dissection)   -pain control (NSAIDs)  -muscle relaxant for neck  -PT/OT  -BP control  -neuro checks

## 2017-12-02 NOTE — DISCHARGE NOTE ADULT - CARE PLAN
Principal Discharge DX:	Bradycardia  Goal:	Prevent recurrence  Instructions for follow-up, activity and diet:	Stop taking  Follow up with your primary care physician and cardiologist for further monitoring in 1-2 weeks. Please call to arrange appointment.  Secondary Diagnosis:	Chest pain  Instructions for follow-up, activity and diet:	The results of your stress test and echocardiogram were normal. Continue Aspirin.  Follow up with your primary care physician for further monitoring in 1-2 weeks. Please call to arrange appointment.  Secondary Diagnosis:	Essential hypertension  Instructions for follow-up, activity and diet:	Continue  low salt diet.  Follow up with your primary care physician for further monitoring in 1-2 weeks. Please call to arrange appointment.  Secondary Diagnosis:	Dizziness  Instructions for follow-up, activity and diet:	Follow up with your primary care physician for further monitoring in 1-2 weeks. Please call to arrange appointment. Principal Discharge DX:	Bradycardia  Goal:	Prevent recurrence  Instructions for follow-up, activity and diet:	Stop taking  Follow up with your primary care physician and cardiologist for further monitoring in 1-2 weeks. Please call to arrange appointment.  Secondary Diagnosis:	Chest pain  Instructions for follow-up, activity and diet:	The results of your stress test and echocardiogram were normal. Continue Aspirin.  Follow up with your primary care physician for further monitoring in 1-2 weeks. Please call to arrange appointment.  Secondary Diagnosis:	Essential hypertension  Instructions for follow-up, activity and diet:	Continue  low salt diet.  Follow up with your primary care physician for further monitoring in 1-2 weeks. Please call to arrange appointment.  Secondary Diagnosis:	Dizziness  Instructions for follow-up, activity and diet:	Follow up with your primary care physician for further monitoring in 1-2 weeks. Please call to arrange appointment.  Secondary Diagnosis:	Cervical radiculopathy  Goal:	Please follow up with Dr Rothman, compliance with medications, follow up with physicians  Instructions for follow-up, activity and diet:	physicial therapy

## 2017-12-02 NOTE — CONSULT NOTE ADULT - SUBJECTIVE AND OBJECTIVE BOX
Neurology Consult    Name: LISSETTE RIGGINS    HPI: 66 yo Creole speaking man who presented to [facility] w/ left-sided chest pain w/ radiation to right axilla along w/ neck pain, admitted for cardiac workup, unremarkable. Working diagnosis is costochondritis. Neurology consulted for new right-sided parietal headache w/ intermitted light-headedness. Patient also presented to ED w/ bradycardia, since resolved. ROS otherwise unremarkable for new acute focal neurologic deficits such as focal weakness, acute vision changes. N/V.     PMH/PSH:   HTN  HLD  BPH   Muscle cramps  (on gabapentin)  chronic right-sided maxillary sinus disease (seen on last CTH)     MEDICATIONS  (STANDING):  amLODIPine   Tablet 10 milliGRAM(s) Oral daily  aspirin  chewable 81 milliGRAM(s) Oral daily  gabapentin 100 milliGRAM(s) Oral three times a day  heparin  Injectable 5000 Unit(s) SubCutaneous every 12 hours  hydrALAZINE 50 milliGRAM(s) Oral every 8 hours  hydrochlorothiazide 12.5 milliGRAM(s) Oral daily  influenza   Vaccine 0.5 milliLiter(s) IntraMuscular once  multivitamin 1 Tablet(s) Oral daily  sodium chloride 0.9% lock flush 3 milliLiter(s) IV Push every 8 hours  sodium chloride 0.9%. 1000 milliLiter(s) (75 mL/Hr) IV Continuous <Continuous>  tamsulosin 0.4 milliGRAM(s) Oral at bedtime    Allergies: No Known Allergies    Objective:   Vital Signs Last 24 Hrs  T(C): 37.1 (02 Dec 2017 05:10), Max: 37.2 (01 Dec 2017 17:33)  T(F): 98.7 (02 Dec 2017 05:10), Max: 98.9 (01 Dec 2017 17:33)  HR: 88 (02 Dec 2017 05:10) (82 - 88)  BP: 121/83 (02 Dec 2017 05:10) (121/83 - 136/88)  RR: 17 (02 Dec 2017 05:10) (17 - 18)  SpO2: 99% (02 Dec 2017 05:10) (98% - 99%)    General Exam:   General appearance: No acute distress                   Neurological Exam:  Mental Status: AAOx3, fluent speech, follows commands    Cranial Nerves: EOMI, PERRL, V1-V3 intact, facial symmetry intact, no dysarthria, tongue midline, VFF    Motor: 5/5 throughout. No drift x4    Sensation: Intact to LT throughout    Coordination: FTN intact b/l    Reflexes: 1+ bilateral biceps, brachioradialis, patellar and ankle    Gait: normal and stable.      Labs:    12-02    141  |  102  |  21  ----------------------------<  100<H>  3.3<L>   |  25  |  1.44<H>    Ca    9.1      02 Dec 2017 06:50  Phos  2.8     12-01  Mg     2.1     12-02    TPro  6.9  /  Alb  3.9  /  TBili  0.3  /  DBili  x   /  AST  30  /  ALT  21  /  AlkPhos  34<L>  11-30    LIVER FUNCTIONS - ( 30 Nov 2017 15:58 )  Alb: 3.9 g/dL / Pro: 6.9 g/dL / ALK PHOS: 34 u/L / ALT: 21 u/L / AST: 30 u/L / GGT: x           CBC Full  -  ( 02 Dec 2017 06:50 )  WBC Count : 4.96 K/uL  Hemoglobin : 15.1 g/dL  Hematocrit : 44.8 %  Platelet Count - Automated : 208 K/uL  Mean Cell Volume : 92.9 fL  Mean Cell Hemoglobin : 31.3 pg  Mean Cell Hemoglobin Concentration : 33.7 %  Auto Neutrophil # : x  Auto Lymphocyte # : x  Auto Monocyte # : x  Auto Eosinophil # : x  Auto Basophil # : x  Auto Neutrophil % : x  Auto Lymphocyte % : x  Auto Monocyte % : x  Auto Eosinophil % : x  Auto Basophil % : x      Radiology Neurology Consult    Name: LISSETTE RIGGINS    HPI: 66 yo Creole speaking man who presented to [facility] w/ left-sided chest pain w/ radiation to right axilla along w/ neck pain, admitted for cardiac workup, unremarkable. Working diagnosis is costochondritis. Neurology consulted for new right-sided parietal headache w/ intermitted light-headedness. which patient notes is worse w/ movement of the neck. Patient also presented to ED w/ bradycardia, since resolved. ROS otherwise unremarkable for new acute focal neurologic deficits such as focal weakness, acute vision changes. N/V.     PMH/PSH:   HTN  HLD  BPH   Muscle cramps  (on gabapentin)  chronic right-sided maxillary sinus disease (seen on last CTH)     MEDICATIONS  (STANDING):  amLODIPine   Tablet 10 milliGRAM(s) Oral daily  aspirin  chewable 81 milliGRAM(s) Oral daily  gabapentin 100 milliGRAM(s) Oral three times a day  heparin  Injectable 5000 Unit(s) SubCutaneous every 12 hours  hydrALAZINE 50 milliGRAM(s) Oral every 8 hours  hydrochlorothiazide 12.5 milliGRAM(s) Oral daily  influenza   Vaccine 0.5 milliLiter(s) IntraMuscular once  multivitamin 1 Tablet(s) Oral daily  sodium chloride 0.9% lock flush 3 milliLiter(s) IV Push every 8 hours  sodium chloride 0.9%. 1000 milliLiter(s) (75 mL/Hr) IV Continuous <Continuous>  tamsulosin 0.4 milliGRAM(s) Oral at bedtime    Allergies: No Known Allergies    Objective:   Vital Signs Last 24 Hrs  T(C): 37.1 (02 Dec 2017 05:10), Max: 37.2 (01 Dec 2017 17:33)  T(F): 98.7 (02 Dec 2017 05:10), Max: 98.9 (01 Dec 2017 17:33)  HR: 88 (02 Dec 2017 05:10) (82 - 88)  BP: 121/83 (02 Dec 2017 05:10) (121/83 - 136/88)  RR: 17 (02 Dec 2017 05:10) (17 - 18)  SpO2: 99% (02 Dec 2017 05:10) (98% - 99%)    General Exam:   General appearance: No acute distress    Neck: Full ROM w/ rotation, extension, flexion. Tenderness to palpation posterior neck (right-side).                  Neurological Exam:  Mental Status: AAOx3, fluent speech, follows commands    Cranial Nerves: EOMI, PERRL, V1-V3 intact, facial symmetry intact, no dysarthria, tongue midline, VFF    Motor: 5/5 throughout. No drift x4    Sensation: Intact to LT throughout    Coordination: FTN intact b/l    Reflexes: babinski absent b/l (toes downgoing)     Labs:    12-02    141  |  102  |  21  ----------------------------<  100<H>  3.3<L>   |  25  |  1.44<H>    Ca    9.1      02 Dec 2017 06:50  Phos  2.8     12-01  Mg     2.1     12-02    TPro  6.9  /  Alb  3.9  /  TBili  0.3  /  DBili  x   /  AST  30  /  ALT  21  /  AlkPhos  34<L>  11-30    LIVER FUNCTIONS - ( 30 Nov 2017 15:58 )  Alb: 3.9 g/dL / Pro: 6.9 g/dL / ALK PHOS: 34 u/L / ALT: 21 u/L / AST: 30 u/L / GGT: x           CBC Full  -  ( 02 Dec 2017 06:50 )  WBC Count : 4.96 K/uL  Hemoglobin : 15.1 g/dL  Hematocrit : 44.8 %  Platelet Count - Automated : 208 K/uL  Mean Cell Volume : 92.9 fL  Mean Cell Hemoglobin : 31.3 pg  Mean Cell Hemoglobin Concentration : 33.7 %  Auto Neutrophil # : x  Auto Lymphocyte # : x  Auto Monocyte # : x  Auto Eosinophil # : x  Auto Basophil # : x  Auto Neutrophil % : x  Auto Lymphocyte % : x  Auto Monocyte % : x  Auto Eosinophil % : x  Auto Basophil % : x      Radiology

## 2017-12-02 NOTE — DISCHARGE NOTE ADULT - HOSPITAL COURSE
67M with a history of HTN, dyslipidemia, BPH, experiencing non exertional, intermittent L sided CP that radiates to the R arm, neck pain. The chest pain episodes last about 10 minutes, is worse with ambulation, improves with aspirin.  Positive SOB. Denies diaphoresis, chills, nausea, vomit, dizziness, falls, slurring of words, facial droop, weakness.  In the Ed, the pt is found to have a heart rat e in the 40's. Th ept was placed on a Zoll and is currently chest pain free at this time. 67M with a history of HTN, dyslipidemia, BPH, experiencing non exertional, intermittent L sided CP that radiates to the R arm, neck pain. The chest pain episodes last about 10 minutes, is worse with ambulation, improves with aspirin.  Positive SOB. Denies diaphoresis, chills, nausea, vomit, dizziness, falls, slurring of words, facial droop, weakness.  In the Ed, the pt is found to have a heart rat e in the 40's. The pt was placed on a Zoll and is currently chest pain free at this time.     On Admission:  CXR  = clear lungs  SB @ 48b/ min, TWI 1,2, AVL, V5 V6  CE negative x 2   BUN/ Creatinine = 18/ 1.    NST: * Myocardial Perfusion SPECT results are normal.  * Normal myocardial perfusion scan,with no evidence of infarction or inducible ischemia. There was a small mild inferior defect that was no longer evident on prone imaging for attenuation correction.  * Post-stress gated wall motion analysis was performed (LVEF = 50 %;LVEDV = 90 ml.), revealing normal LV systolic function. The LV time activity curve suggested diastolic dysfunction. RV function appeared normal.    Echo- EF 69%, 1.Mitral annular calcification, otherwise normal mitral valve.  2. Normal trileaflet aortic valve. Minimal aortic regurgitation.  3. Increased relative wall thickness with normal left ventricular mass index, consistent with concentric left ventricular remodeling.  4.Normal left ventricular systolic function. No segmental wall motion abnormalities.  5.Normal right ventricular size and function.      12/2 CT head/CTA head and neck:  Microvascular ischemic type changes as described.No evidence of acute  infarct. Opacified right maxillary sinus with associated calcifications.    CTA neck: No significant stenosis. Both vertebral arteries are seen with  a normal vertebrobasilar confluence.    CTA [place]: Narrowing of the mid and distal portion of the  basilar to a moderate degree.R elative fetal origin of the right PCA from the right internal carotid artery with a somewhat diminutive left P1 and  diminutive left P-comm.  Findings suggest some compromise at the level of the posterior circulation.    12/4 MRI brain: IMPRESSION:No acute or subacute infarction.Mild to moderate severity microvascular ischemic change.Interval chronic left corona radiata lacunar infarct since 2/2016.      12/4 MRI cervical:    12/3 Cards: Atypical Chest pain MI ruled out / NST negative ---likely costocondritis. Dizziness r/o CVA. Tele NSR. ECHO and NST normal, negative orthostatic v/s.    Neuro:    Incomplete** 67M with a history of HTN, dyslipidemia, BPH, experiencing non exertional, intermittent L sided CP that radiates to the R arm, neck pain. The chest pain episodes last about 10 minutes, is worse with ambulation, improves with aspirin.  Positive SOB. Denies diaphoresis, chills, nausea, vomit, dizziness, falls, slurring of words, facial droop, weakness.  In the Ed, the pt is found to have a heart rat e in the 40's. The pt was placed on a Zoll and is currently chest pain free at this time.     On Admission:  CXR  = clear lungs  SB @ 48b/ min, TWI 1,2, AVL, V5 V6  CE negative x 2   BUN/ Creatinine = 18/ 1.    NST: * Myocardial Perfusion SPECT results are normal.  * Normal myocardial perfusion scan,with no evidence of infarction or inducible ischemia. There was a small mild inferior defect that was no longer evident on prone imaging for attenuation correction.  * Post-stress gated wall motion analysis was performed (LVEF = 50 %;LVEDV = 90 ml.), revealing normal LV systolic function. The LV time activity curve suggested diastolic dysfunction. RV function appeared normal.    Echo- EF 69%, 1.Mitral annular calcification, otherwise normal mitral valve.  2. Normal trileaflet aortic valve. Minimal aortic regurgitation.  3. Increased relative wall thickness with normal left ventricular mass index, consistent with concentric left ventricular remodeling.  4.Normal left ventricular systolic function. No segmental wall motion abnormalities.  5.Normal right ventricular size and function.      12/2 CT head/CTA head and neck:  Microvascular ischemic type changes as described.No evidence of acute  infarct. Opacified right maxillary sinus with associated calcifications.    CTA neck: No significant stenosis. Both vertebral arteries are seen with  a normal vertebrobasilar confluence.    CTA [place]: Narrowing of the mid and distal portion of the  basilar to a moderate degree.R elative fetal origin of the right PCA from the right internal carotid artery with a somewhat diminutive left P1 and  diminutive left P-comm.  Findings suggest some compromise at the level of the posterior circulation.    12/4 MRI brain: IMPRESSION:No acute or subacute infarction.Mild to moderate severity microvascular ischemic change.Interval chronic left corona radiata lacunar infarct since 2/2016.      12/4 MRI cervical:    12/3 Cards: Atypical Chest pain MI ruled out / NST negative ---likely costocondritis. Dizziness r/o CVA. Tele NSR. ECHO and NST normal, negative orthostatic v/s.      12/4 MRI brain: IMPRESSION: No acute or subacute infarction. Mild to moderate severity microvascular ischemic change. Interval chronic left corona radiata lacunar infarct since 2/2016.  12/4 MRI cervical: No cord compression. No moderate or severe neural foraminal stenosis.  12/5- Patient is hemodynamically stable and without complaints and patient is ready for discharge.

## 2017-12-02 NOTE — DISCHARGE NOTE ADULT - MEDICATION SUMMARY - MEDICATIONS TO STOP TAKING
I will STOP taking the medications listed below when I get home from the hospital:    enalapril 10 mg oral tablet  -- 2 tab(s) by mouth once a day    enalapril 20 mg oral tablet  -- 1 tab(s) by mouth 2 times a day    metoprolol succinate 25 mg oral tablet, extended release  -- 1 tab(s) by mouth once a day

## 2017-12-02 NOTE — DISCHARGE NOTE ADULT - PATIENT PORTAL LINK FT
“You can access the [facility] Patient Portal, offered by [facility], by registering with the following website: [email or web address]”

## 2017-12-02 NOTE — PROGRESS NOTE ADULT - SUBJECTIVE AND OBJECTIVE BOX
S/  Pt denies cp and SOB    ROS c/o dizziness; no headache    Tele NSR    O/  121/83  88  17  99% afebrile  98.7  Gen: elderly male NAD  Neck: no JVP  Lungs: clear  CVS:  s1s2 NRRR no gallops or murmurs  Abd: soft NT ND +BS  Ext: trace LE edema; no calf tenderness  CNS: aao x 3 non-focal     echo EF 69% LVH  NST normal EF 50% DD

## 2017-12-02 NOTE — DISCHARGE NOTE ADULT - PLAN OF CARE
Prevent recurrence Stop taking  Follow up with your primary care physician and cardiologist for further monitoring in 1-2 weeks. Please call to arrange appointment. The results of your stress test and echocardiogram were normal. Continue Aspirin.  Follow up with your primary care physician for further monitoring in 1-2 weeks. Please call to arrange appointment. Continue  low salt diet.  Follow up with your primary care physician for further monitoring in 1-2 weeks. Please call to arrange appointment. Follow up with your primary care physician for further monitoring in 1-2 weeks. Please call to arrange appointment. Please follow up with Dr Rothman, compliance with medications, follow up with physicians physicial therapy

## 2017-12-02 NOTE — CONSULT NOTE ADULT - ASSESSMENT
66 yo Creole speaking man who presented to [facility] w/ left-sided chest pain w/ radiation to right axilla along w/ neck pain, admitted for cardiac workup, unremarkable. Working diagnosis is costochondritis. Neurology consulted for new right-sided parietal headache w/ intermitted light-headedness. Patient also presented to ED w/ bradycardia, since resolved. ROS otherwise unremarkable for new acute focal neurologic deficits such as focal weakness, acute vision changes. N/V. 66 yo Creole speaking man who presented to [facility] w/ left-sided chest pain w/ radiation to right axilla along w/ neck pain, admitted for cardiac workup, unremarkable. Working diagnosis is costochondritis. Neurology consulted for new right-sided throbbing parietal headache w/ intermitted light-headedness. Patient also presented to ED w/ bradycardia, since resolved. ROS otherwise unremarkable for new acute focal neurologic deficits such as focal weakness, acute vision changes. N/V.

## 2017-12-02 NOTE — DISCHARGE NOTE ADULT - MEDICATION SUMMARY - MEDICATIONS TO TAKE
I will START or STAY ON the medications listed below when I get home from the hospital:    aspirin 81 mg oral tablet  -- 1 tab(s) by mouth once a day  -- Indication: For Cardiac protection    tamsulosin 0.4 mg oral capsule  -- 1 cap(s) by mouth once a day  -- Indication: For Bph    amLODIPine 10 mg oral tablet  -- 1 tab(s) by mouth once a day  -- Indication: For Blood pressure    hydroCHLOROthiazide 12.5 mg oral tablet  -- 1 tab(s) by mouth once a day  -- Indication: For Blood pressure

## 2017-12-03 LAB
BASOPHILS # BLD AUTO: 0.01 K/UL — SIGNIFICANT CHANGE UP (ref 0–0.2)
BASOPHILS NFR BLD AUTO: 0.2 % — SIGNIFICANT CHANGE UP (ref 0–2)
BUN SERPL-MCNC: 18 MG/DL — SIGNIFICANT CHANGE UP (ref 7–23)
BUN SERPL-MCNC: 18 MG/DL — SIGNIFICANT CHANGE UP (ref 7–23)
CALCIUM SERPL-MCNC: 9.6 MG/DL — SIGNIFICANT CHANGE UP (ref 8.4–10.5)
CALCIUM SERPL-MCNC: 9.6 MG/DL — SIGNIFICANT CHANGE UP (ref 8.4–10.5)
CHLORIDE SERPL-SCNC: 100 MMOL/L — SIGNIFICANT CHANGE UP (ref 98–107)
CHLORIDE SERPL-SCNC: 100 MMOL/L — SIGNIFICANT CHANGE UP (ref 98–107)
CO2 SERPL-SCNC: 24 MMOL/L — SIGNIFICANT CHANGE UP (ref 22–31)
CO2 SERPL-SCNC: 24 MMOL/L — SIGNIFICANT CHANGE UP (ref 22–31)
CREAT SERPL-MCNC: 1.72 MG/DL — HIGH (ref 0.5–1.3)
CREAT SERPL-MCNC: 1.72 MG/DL — HIGH (ref 0.5–1.3)
CRP SERPL-MCNC: 5.8 MG/L — HIGH
EOSINOPHIL # BLD AUTO: 0.01 K/UL — SIGNIFICANT CHANGE UP (ref 0–0.5)
EOSINOPHIL NFR BLD AUTO: 0.2 % — SIGNIFICANT CHANGE UP (ref 0–6)
ERYTHROCYTE [SEDIMENTATION RATE] IN BLOOD: 12 MM/HR — SIGNIFICANT CHANGE UP (ref 1–15)
GLUCOSE SERPL-MCNC: 107 MG/DL — HIGH (ref 70–99)
GLUCOSE SERPL-MCNC: 107 MG/DL — HIGH (ref 70–99)
HCT VFR BLD CALC: 45.5 % — SIGNIFICANT CHANGE UP (ref 39–50)
HCT VFR BLD CALC: 45.5 % — SIGNIFICANT CHANGE UP (ref 39–50)
HGB BLD-MCNC: 15.8 G/DL — SIGNIFICANT CHANGE UP (ref 13–17)
HGB BLD-MCNC: 15.8 G/DL — SIGNIFICANT CHANGE UP (ref 13–17)
IMM GRANULOCYTES # BLD AUTO: 0.01 # — SIGNIFICANT CHANGE UP
IMM GRANULOCYTES NFR BLD AUTO: 0.2 % — SIGNIFICANT CHANGE UP (ref 0–1.5)
LYMPHOCYTES # BLD AUTO: 1.49 K/UL — SIGNIFICANT CHANGE UP (ref 1–3.3)
LYMPHOCYTES # BLD AUTO: 25.1 % — SIGNIFICANT CHANGE UP (ref 13–44)
MAGNESIUM SERPL-MCNC: 2 MG/DL — SIGNIFICANT CHANGE UP (ref 1.6–2.6)
MCHC RBC-ENTMCNC: 32.6 PG — SIGNIFICANT CHANGE UP (ref 27–34)
MCHC RBC-ENTMCNC: 32.6 PG — SIGNIFICANT CHANGE UP (ref 27–34)
MCHC RBC-ENTMCNC: 34.7 % — SIGNIFICANT CHANGE UP (ref 32–36)
MCHC RBC-ENTMCNC: 34.7 % — SIGNIFICANT CHANGE UP (ref 32–36)
MCV RBC AUTO: 93.8 FL — SIGNIFICANT CHANGE UP (ref 80–100)
MCV RBC AUTO: 93.8 FL — SIGNIFICANT CHANGE UP (ref 80–100)
MONOCYTES # BLD AUTO: 0.6 K/UL — SIGNIFICANT CHANGE UP (ref 0–0.9)
MONOCYTES NFR BLD AUTO: 10.1 % — SIGNIFICANT CHANGE UP (ref 2–14)
NEUTROPHILS # BLD AUTO: 3.82 K/UL — SIGNIFICANT CHANGE UP (ref 1.8–7.4)
NEUTROPHILS NFR BLD AUTO: 64.2 % — SIGNIFICANT CHANGE UP (ref 43–77)
NRBC # FLD: 0 — SIGNIFICANT CHANGE UP
NRBC # FLD: 0 — SIGNIFICANT CHANGE UP
PLATELET # BLD AUTO: 208 K/UL — SIGNIFICANT CHANGE UP (ref 150–400)
PLATELET # BLD AUTO: 208 K/UL — SIGNIFICANT CHANGE UP (ref 150–400)
PMV BLD: 9.4 FL — SIGNIFICANT CHANGE UP (ref 7–13)
PMV BLD: 9.4 FL — SIGNIFICANT CHANGE UP (ref 7–13)
POTASSIUM SERPL-MCNC: 3.4 MMOL/L — LOW (ref 3.5–5.3)
POTASSIUM SERPL-MCNC: 3.4 MMOL/L — LOW (ref 3.5–5.3)
POTASSIUM SERPL-SCNC: 3.4 MMOL/L — LOW (ref 3.5–5.3)
POTASSIUM SERPL-SCNC: 3.4 MMOL/L — LOW (ref 3.5–5.3)
RBC # BLD: 4.85 M/UL — SIGNIFICANT CHANGE UP (ref 4.2–5.8)
RBC # BLD: 4.85 M/UL — SIGNIFICANT CHANGE UP (ref 4.2–5.8)
RBC # FLD: 13.9 % — SIGNIFICANT CHANGE UP (ref 10.3–14.5)
RBC # FLD: 13.9 % — SIGNIFICANT CHANGE UP (ref 10.3–14.5)
SODIUM SERPL-SCNC: 140 MMOL/L — SIGNIFICANT CHANGE UP (ref 135–145)
SODIUM SERPL-SCNC: 140 MMOL/L — SIGNIFICANT CHANGE UP (ref 135–145)
WBC # BLD: 5.94 K/UL — SIGNIFICANT CHANGE UP (ref 3.8–10.5)
WBC # BLD: 5.94 K/UL — SIGNIFICANT CHANGE UP (ref 3.8–10.5)
WBC # FLD AUTO: 5.94 K/UL — SIGNIFICANT CHANGE UP (ref 3.8–10.5)
WBC # FLD AUTO: 5.94 K/UL — SIGNIFICANT CHANGE UP (ref 3.8–10.5)

## 2017-12-03 RX ORDER — POTASSIUM CHLORIDE 20 MEQ
40 PACKET (EA) ORAL ONCE
Qty: 0 | Refills: 0 | Status: COMPLETED | OUTPATIENT
Start: 2017-12-03 | End: 2017-12-03

## 2017-12-03 RX ORDER — SODIUM CHLORIDE 9 MG/ML
1000 INJECTION INTRAMUSCULAR; INTRAVENOUS; SUBCUTANEOUS
Qty: 0 | Refills: 0 | Status: DISCONTINUED | OUTPATIENT
Start: 2017-12-03 | End: 2017-12-05

## 2017-12-03 RX ADMIN — Medication 1200 MILLIGRAM(S): at 17:40

## 2017-12-03 RX ADMIN — Medication 50 MILLIGRAM(S): at 15:33

## 2017-12-03 RX ADMIN — Medication 1200 MILLIGRAM(S): at 05:39

## 2017-12-03 RX ADMIN — SODIUM CHLORIDE 3 MILLILITER(S): 9 INJECTION INTRAMUSCULAR; INTRAVENOUS; SUBCUTANEOUS at 21:42

## 2017-12-03 RX ADMIN — GABAPENTIN 100 MILLIGRAM(S): 400 CAPSULE ORAL at 05:40

## 2017-12-03 RX ADMIN — Medication 12.5 MILLIGRAM(S): at 05:41

## 2017-12-03 RX ADMIN — HEPARIN SODIUM 5000 UNIT(S): 5000 INJECTION INTRAVENOUS; SUBCUTANEOUS at 17:40

## 2017-12-03 RX ADMIN — AMLODIPINE BESYLATE 10 MILLIGRAM(S): 2.5 TABLET ORAL at 05:40

## 2017-12-03 RX ADMIN — SODIUM CHLORIDE 75 MILLILITER(S): 9 INJECTION INTRAMUSCULAR; INTRAVENOUS; SUBCUTANEOUS at 11:05

## 2017-12-03 RX ADMIN — SODIUM CHLORIDE 3 MILLILITER(S): 9 INJECTION INTRAMUSCULAR; INTRAVENOUS; SUBCUTANEOUS at 11:34

## 2017-12-03 RX ADMIN — Medication 50 MILLIGRAM(S): at 05:40

## 2017-12-03 RX ADMIN — Medication 40 MILLIEQUIVALENT(S): at 11:33

## 2017-12-03 RX ADMIN — Medication 81 MILLIGRAM(S): at 11:33

## 2017-12-03 RX ADMIN — HEPARIN SODIUM 5000 UNIT(S): 5000 INJECTION INTRAVENOUS; SUBCUTANEOUS at 05:41

## 2017-12-03 RX ADMIN — TAMSULOSIN HYDROCHLORIDE 0.4 MILLIGRAM(S): 0.4 CAPSULE ORAL at 21:42

## 2017-12-03 RX ADMIN — GABAPENTIN 100 MILLIGRAM(S): 400 CAPSULE ORAL at 21:42

## 2017-12-03 RX ADMIN — SODIUM CHLORIDE 3 MILLILITER(S): 9 INJECTION INTRAMUSCULAR; INTRAVENOUS; SUBCUTANEOUS at 05:43

## 2017-12-03 RX ADMIN — GABAPENTIN 100 MILLIGRAM(S): 400 CAPSULE ORAL at 15:33

## 2017-12-03 RX ADMIN — Medication 1 TABLET(S): at 11:34

## 2017-12-03 RX ADMIN — Medication 50 MILLIGRAM(S): at 21:42

## 2017-12-03 NOTE — PROGRESS NOTE ADULT - ASSESSMENT
Atypical Chest pain MI ruled out / NST negative ---likely costocondritis   Dizziness r/o CVA    Tele NSR  ECHO and NST normal   negative orthostatic v/s    Neuro House evaluation for dizziness and r/o CVA   PT evaluation     discharge if cleared by Neurology and PT Atypical Chest pain MI ruled out / NST negative ---likely costocondritis   Dizziness r/o CVA    Tele NSR  ECHO and NST normal   negative orthostatic v/s    Neuro consult appreciated r/o CVA ---wkup in progress  PT evaluation     discharge if cleared by Neurology and PT

## 2017-12-03 NOTE — PROGRESS NOTE ADULT - SUBJECTIVE AND OBJECTIVE BOX
S/  Pt denies cp and SOB    ROS c/o dizziness; no headache    Tele NSR    O/  125/75  92  17  96  99.4  Gen: elderly male NAD  Neck: no JVP  Lungs: clear  CVS:  s1s2 NRRR no gallops or murmurs  Abd: soft NT ND +BS  Ext: trace LE edema; no calf tenderness  CNS: aao x 3 non-focal     echo EF 69% LVH  NST normal EF 50% DD

## 2017-12-03 NOTE — PROGRESS NOTE ADULT - SUBJECTIVE AND OBJECTIVE BOX
see consult for details  check esr/crp to r/o temp arteritis (though seems less likely), check mri brain/ c spine

## 2017-12-04 LAB
BASOPHILS # BLD AUTO: 0.01 K/UL — SIGNIFICANT CHANGE UP (ref 0–0.2)
BASOPHILS NFR BLD AUTO: 0.2 % — SIGNIFICANT CHANGE UP (ref 0–2)
BUN SERPL-MCNC: 17 MG/DL — SIGNIFICANT CHANGE UP (ref 7–23)
BUN SERPL-MCNC: 17 MG/DL — SIGNIFICANT CHANGE UP (ref 7–23)
CALCIUM SERPL-MCNC: 9.1 MG/DL — SIGNIFICANT CHANGE UP (ref 8.4–10.5)
CALCIUM SERPL-MCNC: 9.1 MG/DL — SIGNIFICANT CHANGE UP (ref 8.4–10.5)
CHLORIDE SERPL-SCNC: 100 MMOL/L — SIGNIFICANT CHANGE UP (ref 98–107)
CHLORIDE SERPL-SCNC: 100 MMOL/L — SIGNIFICANT CHANGE UP (ref 98–107)
CO2 SERPL-SCNC: 22 MMOL/L — SIGNIFICANT CHANGE UP (ref 22–31)
CO2 SERPL-SCNC: 22 MMOL/L — SIGNIFICANT CHANGE UP (ref 22–31)
CREAT SERPL-MCNC: 1.87 MG/DL — HIGH (ref 0.5–1.3)
CREAT SERPL-MCNC: 1.87 MG/DL — HIGH (ref 0.5–1.3)
CRP SERPL-MCNC: 7.9 MG/L — HIGH
EOSINOPHIL # BLD AUTO: 0.04 K/UL — SIGNIFICANT CHANGE UP (ref 0–0.5)
EOSINOPHIL NFR BLD AUTO: 0.8 % — SIGNIFICANT CHANGE UP (ref 0–6)
ERYTHROCYTE [SEDIMENTATION RATE] IN BLOOD: 13 MM/HR — SIGNIFICANT CHANGE UP (ref 1–15)
GLUCOSE SERPL-MCNC: 140 MG/DL — HIGH (ref 70–99)
GLUCOSE SERPL-MCNC: 140 MG/DL — HIGH (ref 70–99)
HCT VFR BLD CALC: 47.8 % — SIGNIFICANT CHANGE UP (ref 39–50)
HCT VFR BLD CALC: 47.8 % — SIGNIFICANT CHANGE UP (ref 39–50)
HGB BLD-MCNC: 15.6 G/DL — SIGNIFICANT CHANGE UP (ref 13–17)
HGB BLD-MCNC: 15.6 G/DL — SIGNIFICANT CHANGE UP (ref 13–17)
IMM GRANULOCYTES # BLD AUTO: 0.01 # — SIGNIFICANT CHANGE UP
IMM GRANULOCYTES NFR BLD AUTO: 0.2 % — SIGNIFICANT CHANGE UP (ref 0–1.5)
LYMPHOCYTES # BLD AUTO: 1.72 K/UL — SIGNIFICANT CHANGE UP (ref 1–3.3)
LYMPHOCYTES # BLD AUTO: 34.3 % — SIGNIFICANT CHANGE UP (ref 13–44)
MAGNESIUM SERPL-MCNC: 2 MG/DL — SIGNIFICANT CHANGE UP (ref 1.6–2.6)
MCHC RBC-ENTMCNC: 31.2 PG — SIGNIFICANT CHANGE UP (ref 27–34)
MCHC RBC-ENTMCNC: 31.2 PG — SIGNIFICANT CHANGE UP (ref 27–34)
MCHC RBC-ENTMCNC: 32.6 % — SIGNIFICANT CHANGE UP (ref 32–36)
MCHC RBC-ENTMCNC: 32.6 % — SIGNIFICANT CHANGE UP (ref 32–36)
MCV RBC AUTO: 95.6 FL — SIGNIFICANT CHANGE UP (ref 80–100)
MCV RBC AUTO: 95.6 FL — SIGNIFICANT CHANGE UP (ref 80–100)
MONOCYTES # BLD AUTO: 0.54 K/UL — SIGNIFICANT CHANGE UP (ref 0–0.9)
MONOCYTES NFR BLD AUTO: 10.8 % — SIGNIFICANT CHANGE UP (ref 2–14)
NEUTROPHILS # BLD AUTO: 2.7 K/UL — SIGNIFICANT CHANGE UP (ref 1.8–7.4)
NEUTROPHILS NFR BLD AUTO: 53.7 % — SIGNIFICANT CHANGE UP (ref 43–77)
NRBC # FLD: 0 — SIGNIFICANT CHANGE UP
NRBC # FLD: 0 — SIGNIFICANT CHANGE UP
PLATELET # BLD AUTO: 214 K/UL — SIGNIFICANT CHANGE UP (ref 150–400)
PLATELET # BLD AUTO: 214 K/UL — SIGNIFICANT CHANGE UP (ref 150–400)
PMV BLD: 9.1 FL — SIGNIFICANT CHANGE UP (ref 7–13)
PMV BLD: 9.1 FL — SIGNIFICANT CHANGE UP (ref 7–13)
POTASSIUM SERPL-MCNC: 3.7 MMOL/L — SIGNIFICANT CHANGE UP (ref 3.5–5.3)
POTASSIUM SERPL-MCNC: 3.7 MMOL/L — SIGNIFICANT CHANGE UP (ref 3.5–5.3)
POTASSIUM SERPL-SCNC: 3.7 MMOL/L — SIGNIFICANT CHANGE UP (ref 3.5–5.3)
POTASSIUM SERPL-SCNC: 3.7 MMOL/L — SIGNIFICANT CHANGE UP (ref 3.5–5.3)
RBC # BLD: 5 M/UL — SIGNIFICANT CHANGE UP (ref 4.2–5.8)
RBC # BLD: 5 M/UL — SIGNIFICANT CHANGE UP (ref 4.2–5.8)
RBC # FLD: 14.2 % — SIGNIFICANT CHANGE UP (ref 10.3–14.5)
RBC # FLD: 14.2 % — SIGNIFICANT CHANGE UP (ref 10.3–14.5)
SODIUM SERPL-SCNC: 138 MMOL/L — SIGNIFICANT CHANGE UP (ref 135–145)
SODIUM SERPL-SCNC: 138 MMOL/L — SIGNIFICANT CHANGE UP (ref 135–145)
WBC # BLD: 5.02 K/UL — SIGNIFICANT CHANGE UP (ref 3.8–10.5)
WBC # BLD: 5.02 K/UL — SIGNIFICANT CHANGE UP (ref 3.8–10.5)
WBC # FLD AUTO: 5.02 K/UL — SIGNIFICANT CHANGE UP (ref 3.8–10.5)
WBC # FLD AUTO: 5.02 K/UL — SIGNIFICANT CHANGE UP (ref 3.8–10.5)

## 2017-12-04 PROCEDURE — 70551 MRI BRAIN STEM W/O DYE: CPT | Mod: 26

## 2017-12-04 PROCEDURE — 72141 MRI NECK SPINE W/O DYE: CPT | Mod: 26

## 2017-12-04 RX ORDER — GABAPENTIN 400 MG/1
1 CAPSULE ORAL
Qty: 90 | Refills: 0 | OUTPATIENT
Start: 2017-12-04 | End: 2018-01-03

## 2017-12-04 RX ORDER — HYDRALAZINE HCL 50 MG
1 TABLET ORAL
Qty: 90 | Refills: 0 | OUTPATIENT
Start: 2017-12-04 | End: 2018-01-03

## 2017-12-04 RX ORDER — METOPROLOL TARTRATE 50 MG
1 TABLET ORAL
Qty: 0 | Refills: 0 | COMMUNITY

## 2017-12-04 RX ORDER — ACETAMINOPHEN 500 MG
650 TABLET ORAL EVERY 6 HOURS
Qty: 0 | Refills: 0 | Status: DISCONTINUED | OUTPATIENT
Start: 2017-12-04 | End: 2017-12-05

## 2017-12-04 RX ADMIN — Medication 1200 MILLIGRAM(S): at 05:19

## 2017-12-04 RX ADMIN — GABAPENTIN 100 MILLIGRAM(S): 400 CAPSULE ORAL at 05:18

## 2017-12-04 RX ADMIN — GABAPENTIN 100 MILLIGRAM(S): 400 CAPSULE ORAL at 21:41

## 2017-12-04 RX ADMIN — GABAPENTIN 100 MILLIGRAM(S): 400 CAPSULE ORAL at 14:24

## 2017-12-04 RX ADMIN — Medication 50 MILLIGRAM(S): at 21:41

## 2017-12-04 RX ADMIN — Medication 1 TABLET(S): at 14:24

## 2017-12-04 RX ADMIN — HEPARIN SODIUM 5000 UNIT(S): 5000 INJECTION INTRAVENOUS; SUBCUTANEOUS at 17:21

## 2017-12-04 RX ADMIN — AMLODIPINE BESYLATE 10 MILLIGRAM(S): 2.5 TABLET ORAL at 05:18

## 2017-12-04 RX ADMIN — HEPARIN SODIUM 5000 UNIT(S): 5000 INJECTION INTRAVENOUS; SUBCUTANEOUS at 05:18

## 2017-12-04 RX ADMIN — Medication 50 MILLIGRAM(S): at 05:18

## 2017-12-04 RX ADMIN — Medication 650 MILLIGRAM(S): at 22:17

## 2017-12-04 RX ADMIN — SODIUM CHLORIDE 3 MILLILITER(S): 9 INJECTION INTRAMUSCULAR; INTRAVENOUS; SUBCUTANEOUS at 05:21

## 2017-12-04 RX ADMIN — SODIUM CHLORIDE 3 MILLILITER(S): 9 INJECTION INTRAMUSCULAR; INTRAVENOUS; SUBCUTANEOUS at 21:41

## 2017-12-04 RX ADMIN — TAMSULOSIN HYDROCHLORIDE 0.4 MILLIGRAM(S): 0.4 CAPSULE ORAL at 21:41

## 2017-12-04 RX ADMIN — Medication 50 MILLIGRAM(S): at 14:24

## 2017-12-04 RX ADMIN — SODIUM CHLORIDE 3 MILLILITER(S): 9 INJECTION INTRAMUSCULAR; INTRAVENOUS; SUBCUTANEOUS at 14:23

## 2017-12-04 RX ADMIN — Medication 650 MILLIGRAM(S): at 23:00

## 2017-12-04 RX ADMIN — Medication 81 MILLIGRAM(S): at 14:24

## 2017-12-04 RX ADMIN — Medication 12.5 MILLIGRAM(S): at 05:18

## 2017-12-04 NOTE — PROGRESS NOTE ADULT - ASSESSMENT
Atypical Chest pain MI ruled out / NST negative ---likely costocondritis   Dizziness due to chronic lacunar infarct  Acute on Chronic renal failure    Tele NSR  ECHO and NST normal   negative orthostatic v/s    Neuro ffup  PT evaluation     discharge tomorrow if cleared by Neurology and PT Atypical Chest pain MI ruled out / NST negative ---likely costocondritis   Dizziness due to chronic lacunar infarct  Acute on Chronic renal failure    Tele NSR  ECHO and NST normal   negative orthostatic v/s    Neuro ffup  PT evaluation   IV fluids for NAMAN  continue other cardiac and BP meds    discharge tomorrow if cleared by Neurology and PT

## 2017-12-04 NOTE — PROGRESS NOTE ADULT - SUBJECTIVE AND OBJECTIVE BOX
S/  Pt denies cp and SOB    ROS c/o dizziness; no headache    Tele NSR    O/  T, P, R, SpO2, BP    Temperature  Temp (F): 98.3 Degrees F  Temp (C) Temp (C): 36.8 Degrees C    Heart Rate  Heart Rate Heart Rate (beats/min): 93 /min  Heart Rate Method: noninvasive blood pressure monitor    Noninvasive Blood Pressure  BP Systolic Systolic: 127 mm Hg  BP Diastolic Diastolic (mm Hg): 81 mm Hg  Blood Pressure - Site Site: right upper arm  Blood Pressure - Method Method: electronic    Respiratory/Pulse Oximetry  Respiration Rate (breaths/min) Respiration Rate (breaths/min): 18 /min  SpO2 (%) SpO2 (%): 97 %  O2 delivery Patient On: room air    Gen: elderly male NAD  Neck: no JVP  Lungs: clear  CVS:  s1s2 NRRR no gallops or murmurs  Abd: soft NT ND +BS  Ext: trace LE edema; no calf tenderness  CNS: aao x 3 non-focal     echo EF 69% LVH  NST normal EF 50% DD    MRI brain chronic lacunar infarcts  MRI neck no cord compression

## 2017-12-05 VITALS
OXYGEN SATURATION: 96 % | RESPIRATION RATE: 18 BRPM | HEART RATE: 86 BPM | TEMPERATURE: 98 F | DIASTOLIC BLOOD PRESSURE: 79 MMHG | SYSTOLIC BLOOD PRESSURE: 122 MMHG

## 2017-12-05 LAB
BASOPHILS # BLD AUTO: 0.01 K/UL — SIGNIFICANT CHANGE UP (ref 0–0.2)
BASOPHILS NFR BLD AUTO: 0.2 % — SIGNIFICANT CHANGE UP (ref 0–2)
BUN SERPL-MCNC: 24 MG/DL — HIGH (ref 7–23)
CALCIUM SERPL-MCNC: 9 MG/DL — SIGNIFICANT CHANGE UP (ref 8.4–10.5)
CHLORIDE SERPL-SCNC: 98 MMOL/L — SIGNIFICANT CHANGE UP (ref 98–107)
CO2 SERPL-SCNC: 26 MMOL/L — SIGNIFICANT CHANGE UP (ref 22–31)
CREAT SERPL-MCNC: 1.8 MG/DL — HIGH (ref 0.5–1.3)
EOSINOPHIL # BLD AUTO: 0.11 K/UL — SIGNIFICANT CHANGE UP (ref 0–0.5)
EOSINOPHIL NFR BLD AUTO: 2.5 % — SIGNIFICANT CHANGE UP (ref 0–6)
GLUCOSE SERPL-MCNC: 111 MG/DL — HIGH (ref 70–99)
HCT VFR BLD CALC: 45.4 % — SIGNIFICANT CHANGE UP (ref 39–50)
HGB BLD-MCNC: 15.2 G/DL — SIGNIFICANT CHANGE UP (ref 13–17)
IMM GRANULOCYTES # BLD AUTO: 0.01 # — SIGNIFICANT CHANGE UP
IMM GRANULOCYTES NFR BLD AUTO: 0.2 % — SIGNIFICANT CHANGE UP (ref 0–1.5)
LYMPHOCYTES # BLD AUTO: 1.09 K/UL — SIGNIFICANT CHANGE UP (ref 1–3.3)
LYMPHOCYTES # BLD AUTO: 24.6 % — SIGNIFICANT CHANGE UP (ref 13–44)
MAGNESIUM SERPL-MCNC: 2.1 MG/DL — SIGNIFICANT CHANGE UP (ref 1.6–2.6)
MCHC RBC-ENTMCNC: 31.4 PG — SIGNIFICANT CHANGE UP (ref 27–34)
MCHC RBC-ENTMCNC: 33.5 % — SIGNIFICANT CHANGE UP (ref 32–36)
MCV RBC AUTO: 93.8 FL — SIGNIFICANT CHANGE UP (ref 80–100)
MONOCYTES # BLD AUTO: 0.54 K/UL — SIGNIFICANT CHANGE UP (ref 0–0.9)
MONOCYTES NFR BLD AUTO: 12.2 % — SIGNIFICANT CHANGE UP (ref 2–14)
NEUTROPHILS # BLD AUTO: 2.67 K/UL — SIGNIFICANT CHANGE UP (ref 1.8–7.4)
NEUTROPHILS NFR BLD AUTO: 60.3 % — SIGNIFICANT CHANGE UP (ref 43–77)
NRBC # FLD: 0 — SIGNIFICANT CHANGE UP
PLATELET # BLD AUTO: 195 K/UL — SIGNIFICANT CHANGE UP (ref 150–400)
PMV BLD: 9 FL — SIGNIFICANT CHANGE UP (ref 7–13)
POTASSIUM SERPL-MCNC: 3.5 MMOL/L — SIGNIFICANT CHANGE UP (ref 3.5–5.3)
POTASSIUM SERPL-SCNC: 3.5 MMOL/L — SIGNIFICANT CHANGE UP (ref 3.5–5.3)
RBC # BLD: 4.84 M/UL — SIGNIFICANT CHANGE UP (ref 4.2–5.8)
RBC # FLD: 13.6 % — SIGNIFICANT CHANGE UP (ref 10.3–14.5)
SODIUM SERPL-SCNC: 137 MMOL/L — SIGNIFICANT CHANGE UP (ref 135–145)
WBC # BLD: 4.43 K/UL — SIGNIFICANT CHANGE UP (ref 3.8–10.5)
WBC # FLD AUTO: 4.43 K/UL — SIGNIFICANT CHANGE UP (ref 3.8–10.5)

## 2017-12-05 RX ADMIN — SODIUM CHLORIDE 3 MILLILITER(S): 9 INJECTION INTRAMUSCULAR; INTRAVENOUS; SUBCUTANEOUS at 15:59

## 2017-12-05 RX ADMIN — Medication 1 TABLET(S): at 11:52

## 2017-12-05 RX ADMIN — AMLODIPINE BESYLATE 10 MILLIGRAM(S): 2.5 TABLET ORAL at 11:53

## 2017-12-05 RX ADMIN — HEPARIN SODIUM 5000 UNIT(S): 5000 INJECTION INTRAVENOUS; SUBCUTANEOUS at 05:17

## 2017-12-05 RX ADMIN — GABAPENTIN 100 MILLIGRAM(S): 400 CAPSULE ORAL at 05:17

## 2017-12-05 RX ADMIN — SODIUM CHLORIDE 3 MILLILITER(S): 9 INJECTION INTRAMUSCULAR; INTRAVENOUS; SUBCUTANEOUS at 05:17

## 2017-12-05 RX ADMIN — GABAPENTIN 100 MILLIGRAM(S): 400 CAPSULE ORAL at 11:53

## 2017-12-05 RX ADMIN — Medication 81 MILLIGRAM(S): at 11:53

## 2017-12-05 RX ADMIN — Medication 50 MILLIGRAM(S): at 17:45

## 2017-12-05 NOTE — CONSULT NOTE ADULT - SUBJECTIVE AND OBJECTIVE BOX
NEUROLOGY PROGRESS NOTE      OVERNIGHT EVENTS:  no overnight events      VITAL SIGNS:  Vital Signs Last 24 Hrs  T(C): 37.1 (05 Dec 2017 05:09), Max: 37.1 (05 Dec 2017 05:09)  T(F): 98.7 (05 Dec 2017 05:09), Max: 98.7 (05 Dec 2017 05:09)  HR: 85 (05 Dec 2017 05:09) (81 - 93)  BP: 106/65 (05 Dec 2017 05:09) (106/65 - 127/81)  BP(mean): --  RR: 18 (05 Dec 2017 05:09) (18 - 18)  SpO2: 96% (05 Dec 2017 05:09) (96% - 98%)      LABS:                        15.2   4.43  )-----------( 195      ( 05 Dec 2017 06:44 )             45.4     12-05    137  |  98  |  24<H>  ----------------------------<  111<H>  3.5   |  26  |  1.80<H>    Ca    9.0      05 Dec 2017 06:44  Mg     2.1     12-05    STUDIES:      [x] CTA neck: No significant stenosis. Both vertebral arteries are seen with a normal vertebrobasilar confluence.  [x]CTA [place]: Narrowing of the mid and distal portion of the basilar to a moderate degree. Relative fetal origin of the right PCA from the right internal carotid artery with a somewhat diminutive left P1 and diminutive left P-comm. Findings suggest some compromise at the level of the posterior circulation    [x]MR C spine w/o mark: No cord compression. No moderate or severe neural foraminal stenosis.  [x]MR brain w/o mark: No acute or subacute infarction. Mild to moderate severity microvascular ischemic change. Interval chronic left corona radiata lacunar infarct since 2/2016.    []ESR WNL  []

## 2017-12-05 NOTE — CONSULT NOTE ADULT - ASSESSMENT
68 yo Creole speaking man who presented to [facility] w/ left-sided chest pain w/ radiation to right axilla along w/ neck pain, admitted for cardiac workup, unremarkable. Patient was followed by cards and found to have costochronditis.     Neurology consulted for new right-sided throbbing parietal headache w/ intermitted light-headedness. Patient also presented to ED w/ bradycardia, since resolved. ROS otherwise unremarkable for new acute focal neurologic deficits such as focal weakness, acute vision changes. N/V. MRI brain negative for acute stroke. MR c spine neg for cord pathology. CTA H/N negative for any thrombus or e/o dissection.    []Start low dose statin for stroke prevention  []Cont Asa 81mg  []would obtain A1c level  []Follow up with stroke Neurology as an outpatient, [phone]  []ENT follow up  []PT 68 yo Creole speaking man who presented to [facility] w/ left-sided chest pain w/ radiation to right axilla along w/ neck pain, admitted for cardiac workup, unremarkable. Patient was followed by cards and found to have costochronditis.     Neurology consulted for new right-sided throbbing parietal headache w/ intermitted light-headedness. Patient also presented to ED w/ bradycardia, since resolved. ROS otherwise unremarkable for new acute focal neurologic deficits such as focal weakness, acute vision changes, likely peripheral. N/V. MRI brain negative for acute stroke. MR c spine neg for cord pathology. CTA H/N negative for any thrombus or e/o dissection.    []Start low dose statin for stroke prevention  []Cont Asa 81mg  []would obtain A1c level  []Follow up with stroke Neurology as an outpatient, [phone]  []ENT follow up-outpatient  []PT  []supportive care as per primary team

## 2018-08-03 ENCOUNTER — INPATIENT (INPATIENT)
Facility: HOSPITAL | Age: 68
LOS: 2 days | Discharge: HOME | End: 2018-08-06
Attending: INTERNAL MEDICINE | Admitting: INTERNAL MEDICINE
Payer: MEDICARE

## 2018-08-03 VITALS
HEART RATE: 65 BPM | DIASTOLIC BLOOD PRESSURE: 85 MMHG | OXYGEN SATURATION: 99 % | WEIGHT: 160.06 LBS | RESPIRATION RATE: 18 BRPM | HEIGHT: 67 IN | SYSTOLIC BLOOD PRESSURE: 134 MMHG | TEMPERATURE: 98 F

## 2018-08-03 DIAGNOSIS — N40.0 BENIGN PROSTATIC HYPERPLASIA WITHOUT LOWER URINARY TRACT SYMPTOMS: ICD-10-CM

## 2018-08-03 DIAGNOSIS — I10 ESSENTIAL (PRIMARY) HYPERTENSION: ICD-10-CM

## 2018-08-03 DIAGNOSIS — R07.89 OTHER CHEST PAIN: ICD-10-CM

## 2018-08-03 LAB
ALBUMIN SERPL ELPH-MCNC: 3.4 G/DL — SIGNIFICANT CHANGE UP (ref 3.3–5)
ALP SERPL-CCNC: 37 U/L — LOW (ref 40–120)
ALT FLD-CCNC: 16 U/L — SIGNIFICANT CHANGE UP (ref 12–78)
ANION GAP SERPL CALC-SCNC: 5 MMOL/L — SIGNIFICANT CHANGE UP (ref 5–17)
APTT BLD: 31.1 SEC — SIGNIFICANT CHANGE UP (ref 27.5–37.4)
AST SERPL-CCNC: 15 U/L — SIGNIFICANT CHANGE UP (ref 15–37)
BASOPHILS # BLD AUTO: 0.02 K/UL — SIGNIFICANT CHANGE UP (ref 0–0.2)
BASOPHILS NFR BLD AUTO: 0.5 % — SIGNIFICANT CHANGE UP (ref 0–2)
BILIRUB SERPL-MCNC: 0.7 MG/DL — SIGNIFICANT CHANGE UP (ref 0.2–1.2)
BUN SERPL-MCNC: 12 MG/DL — SIGNIFICANT CHANGE UP (ref 7–23)
CALCIUM SERPL-MCNC: 8.5 MG/DL — SIGNIFICANT CHANGE UP (ref 8.5–10.1)
CHLORIDE SERPL-SCNC: 107 MMOL/L — SIGNIFICANT CHANGE UP (ref 96–108)
CK MB CFR SERPL CALC: <1 NG/ML — SIGNIFICANT CHANGE UP (ref 0.5–3.6)
CK MB CFR SERPL CALC: <1 NG/ML — SIGNIFICANT CHANGE UP (ref 0.5–3.6)
CO2 SERPL-SCNC: 30 MMOL/L — SIGNIFICANT CHANGE UP (ref 22–31)
CREAT SERPL-MCNC: 1.42 MG/DL — HIGH (ref 0.5–1.3)
D DIMER BLD IA.RAPID-MCNC: 230 NG/ML DDU — HIGH
EOSINOPHIL # BLD AUTO: 0.02 K/UL — SIGNIFICANT CHANGE UP (ref 0–0.5)
EOSINOPHIL NFR BLD AUTO: 0.5 % — SIGNIFICANT CHANGE UP (ref 0–6)
GLUCOSE SERPL-MCNC: 101 MG/DL — HIGH (ref 70–99)
HCT VFR BLD CALC: 42.2 % — SIGNIFICANT CHANGE UP (ref 39–50)
HGB BLD-MCNC: 14.2 G/DL — SIGNIFICANT CHANGE UP (ref 13–17)
IMM GRANULOCYTES NFR BLD AUTO: 0 % — SIGNIFICANT CHANGE UP (ref 0–1.5)
INR BLD: 1.01 RATIO — SIGNIFICANT CHANGE UP (ref 0.88–1.16)
LYMPHOCYTES # BLD AUTO: 1.01 K/UL — SIGNIFICANT CHANGE UP (ref 1–3.3)
LYMPHOCYTES # BLD AUTO: 27.7 % — SIGNIFICANT CHANGE UP (ref 13–44)
MAGNESIUM SERPL-MCNC: 2.3 MG/DL — SIGNIFICANT CHANGE UP (ref 1.6–2.6)
MCHC RBC-ENTMCNC: 31.8 PG — SIGNIFICANT CHANGE UP (ref 27–34)
MCHC RBC-ENTMCNC: 33.6 GM/DL — SIGNIFICANT CHANGE UP (ref 32–36)
MCV RBC AUTO: 94.6 FL — SIGNIFICANT CHANGE UP (ref 80–100)
MONOCYTES # BLD AUTO: 0.33 K/UL — SIGNIFICANT CHANGE UP (ref 0–0.9)
MONOCYTES NFR BLD AUTO: 9 % — SIGNIFICANT CHANGE UP (ref 2–14)
NEUTROPHILS # BLD AUTO: 2.27 K/UL — SIGNIFICANT CHANGE UP (ref 1.8–7.4)
NEUTROPHILS NFR BLD AUTO: 62.3 % — SIGNIFICANT CHANGE UP (ref 43–77)
NRBC # BLD: 0 /100 WBCS — SIGNIFICANT CHANGE UP (ref 0–0)
PLATELET # BLD AUTO: 199 K/UL — SIGNIFICANT CHANGE UP (ref 150–400)
POTASSIUM SERPL-MCNC: 3.5 MMOL/L — SIGNIFICANT CHANGE UP (ref 3.5–5.3)
POTASSIUM SERPL-SCNC: 3.5 MMOL/L — SIGNIFICANT CHANGE UP (ref 3.5–5.3)
PROT SERPL-MCNC: 6.9 GM/DL — SIGNIFICANT CHANGE UP (ref 6–8.3)
PROTHROM AB SERPL-ACNC: 11 SEC — SIGNIFICANT CHANGE UP (ref 9.8–12.7)
RBC # BLD: 4.46 M/UL — SIGNIFICANT CHANGE UP (ref 4.2–5.8)
RBC # FLD: 13.4 % — SIGNIFICANT CHANGE UP (ref 10.3–14.5)
SODIUM SERPL-SCNC: 142 MMOL/L — SIGNIFICANT CHANGE UP (ref 135–145)
TROPONIN I SERPL-MCNC: <.015 NG/ML — SIGNIFICANT CHANGE UP (ref 0.01–0.04)
TROPONIN I SERPL-MCNC: <.015 NG/ML — SIGNIFICANT CHANGE UP (ref 0.01–0.04)
WBC # BLD: 3.65 K/UL — LOW (ref 3.8–10.5)
WBC # FLD AUTO: 3.65 K/UL — LOW (ref 3.8–10.5)

## 2018-08-03 PROCEDURE — 71045 X-RAY EXAM CHEST 1 VIEW: CPT | Mod: 26

## 2018-08-03 PROCEDURE — 93010 ELECTROCARDIOGRAM REPORT: CPT

## 2018-08-03 PROCEDURE — 71275 CT ANGIOGRAPHY CHEST: CPT | Mod: 26

## 2018-08-03 PROCEDURE — 99285 EMERGENCY DEPT VISIT HI MDM: CPT

## 2018-08-03 RX ORDER — NITROGLYCERIN 6.5 MG
0.5 CAPSULE, EXTENDED RELEASE ORAL ONCE
Qty: 0 | Refills: 0 | Status: COMPLETED | OUTPATIENT
Start: 2018-08-03 | End: 2018-08-03

## 2018-08-03 RX ORDER — KETOROLAC TROMETHAMINE 30 MG/ML
30 SYRINGE (ML) INJECTION ONCE
Qty: 0 | Refills: 0 | Status: DISCONTINUED | OUTPATIENT
Start: 2018-08-03 | End: 2018-08-03

## 2018-08-03 RX ORDER — ASPIRIN/CALCIUM CARB/MAGNESIUM 324 MG
81 TABLET ORAL DAILY
Qty: 0 | Refills: 0 | Status: DISCONTINUED | OUTPATIENT
Start: 2018-08-03 | End: 2018-08-06

## 2018-08-03 RX ORDER — ACETAMINOPHEN 500 MG
650 TABLET ORAL EVERY 6 HOURS
Qty: 0 | Refills: 0 | Status: DISCONTINUED | OUTPATIENT
Start: 2018-08-03 | End: 2018-08-06

## 2018-08-03 RX ORDER — ENOXAPARIN SODIUM 100 MG/ML
40 INJECTION SUBCUTANEOUS EVERY 24 HOURS
Qty: 0 | Refills: 0 | Status: DISCONTINUED | OUTPATIENT
Start: 2018-08-03 | End: 2018-08-06

## 2018-08-03 RX ORDER — HYDROCHLOROTHIAZIDE 25 MG
12.5 TABLET ORAL DAILY
Qty: 0 | Refills: 0 | Status: DISCONTINUED | OUTPATIENT
Start: 2018-08-03 | End: 2018-08-06

## 2018-08-03 RX ORDER — TAMSULOSIN HYDROCHLORIDE 0.4 MG/1
0.4 CAPSULE ORAL AT BEDTIME
Qty: 0 | Refills: 0 | Status: DISCONTINUED | OUTPATIENT
Start: 2018-08-03 | End: 2018-08-06

## 2018-08-03 RX ORDER — AMLODIPINE BESYLATE 2.5 MG/1
10 TABLET ORAL DAILY
Qty: 0 | Refills: 0 | Status: DISCONTINUED | OUTPATIENT
Start: 2018-08-03 | End: 2018-08-06

## 2018-08-03 RX ADMIN — Medication 0.5 INCH(S): at 21:00

## 2018-08-03 RX ADMIN — Medication 650 MILLIGRAM(S): at 21:30

## 2018-08-03 RX ADMIN — Medication 650 MILLIGRAM(S): at 21:00

## 2018-08-03 RX ADMIN — Medication 30 MILLIGRAM(S): at 11:44

## 2018-08-03 RX ADMIN — Medication 30 MILLIGRAM(S): at 18:10

## 2018-08-03 RX ADMIN — TAMSULOSIN HYDROCHLORIDE 0.4 MILLIGRAM(S): 0.4 CAPSULE ORAL at 21:00

## 2018-08-03 NOTE — ED ADULT TRIAGE NOTE - CHIEF COMPLAINT QUOTE
pt complaining of left side chest pain times 3 days. as per EMS pt hypertensive on arrival. Nitro and aspirin given. history of htn and dm.

## 2018-08-03 NOTE — H&P ADULT - HISTORY OF PRESENT ILLNESS
68m hx htn pw cp. Patient notes 3 days he developed left sided cp. not associated with sob, diaphoresis, cough, nausea or vomiting. he did not take anything for symptoms.

## 2018-08-03 NOTE — ED ADULT NURSE REASSESSMENT NOTE - NS ED NURSE REASSESS COMMENT FT1
Rec'd report on pt. at shift change. Pt on stretcher awake, alert & oriented x4. Continuous cardiac monitoring in place. V/S WDL. Appears to be breathing with ease on room air. Pt states he continues to have back pain and a little Lt sided chest pain. Admitted to Tele. Awaiting bed on unit.

## 2018-08-03 NOTE — H&P ADULT - NSHPLABSRESULTS_GEN_ALL_CORE
14.2   3.65  )-----------( 199      ( 03 Aug 2018 11:30 )             42.2     08-03    142  |  107  |  12  ----------------------------<  101<H>  3.5   |  30  |  1.42<H>    Ca    8.5      03 Aug 2018 11:30  Mg     2.3     08-03    TPro  6.9  /  Alb  3.4  /  TBili  0.7  /  DBili  x   /  AST  15  /  ALT  16  /  AlkPhos  37<L>  08-03    PT/INR - ( 03 Aug 2018 11:30 )   PT: 11.0 sec;   INR: 1.01 ratio         PTT - ( 03 Aug 2018 11:30 )  PTT:31.1 sec

## 2018-08-03 NOTE — ED PROVIDER NOTE - MEDICAL DECISION MAKING DETAILS
patient pw cp. I read ekg as sinus rhythm with sinus arrythmia, rate 63, voltage is low and there  is right axis deviation, no st elevation or depression, no qtc or pr prolongation. will need cta and echo. patient pw cp. I read ekg as sinus rhythm with sinus arrythmia, rate 63, voltage is low and there  is right axis deviation, no st elevation or depression, no qtc or pr prolongation. will need cta and echo. echo saying she cannot get to patient until later. dr moore consulted and he thinks it can more likely be do done over the weekend. will admit to tele and observe.

## 2018-08-03 NOTE — ED ADULT NURSE NOTE - NSIMPLEMENTINTERV_GEN_ALL_ED
Implemented All Universal Safety Interventions:  [place] to call system. Call bell, personal items and telephone within reach. Instruct patient to call for assistance. Room bathroom lighting operational. Non-slip footwear when patient is off stretcher. Physically safe environment: no spills, clutter or unnecessary equipment. Stretcher in lowest position, wheels locked, appropriate side rails in place.

## 2018-08-03 NOTE — ED PROVIDER NOTE - OBJECTIVE STATEMENT
Pertinent PMH/PSH/FHx/SHx and Review of Systems contained within:  68m hx htn pw cp. Patient notes 3 days he developed left sided cp. not associated with sob, diaphoresis, cough, nausea or vomiting. he did not take anything for symptoms. he notes lower back pain which is chronic for him. Patient denies numbness, tingling or weakness of the lower extremity and denies retention or incontinence of the bowel or bladder. no abd pain, dysuria, testicular pain or diarrhea  Fh and Sh not otherwise contributory  ROS otherwise negative

## 2018-08-03 NOTE — ED ADULT NURSE NOTE - OBJECTIVE STATEMENT
patient came in for chest pain to left side 2/10  and chronic back pain 6 [id number]. ivhl right a/c #20 blood sent ekg monitor   tolerated ct.

## 2018-08-04 LAB
CK MB CFR SERPL CALC: <1 NG/ML — SIGNIFICANT CHANGE UP (ref 0.5–3.6)
TROPONIN I SERPL-MCNC: <.015 NG/ML — SIGNIFICANT CHANGE UP (ref 0.01–0.04)

## 2018-08-04 PROCEDURE — 93306 TTE W/DOPPLER COMPLETE: CPT | Mod: 26

## 2018-08-04 PROCEDURE — 99223 1ST HOSP IP/OBS HIGH 75: CPT

## 2018-08-04 RX ADMIN — AMLODIPINE BESYLATE 10 MILLIGRAM(S): 2.5 TABLET ORAL at 05:34

## 2018-08-04 RX ADMIN — ENOXAPARIN SODIUM 40 MILLIGRAM(S): 100 INJECTION SUBCUTANEOUS at 05:35

## 2018-08-04 RX ADMIN — TAMSULOSIN HYDROCHLORIDE 0.4 MILLIGRAM(S): 0.4 CAPSULE ORAL at 22:26

## 2018-08-04 RX ADMIN — Medication 81 MILLIGRAM(S): at 12:43

## 2018-08-04 RX ADMIN — Medication 0.5 INCH(S): at 09:29

## 2018-08-04 RX ADMIN — Medication 12.5 MILLIGRAM(S): at 05:34

## 2018-08-04 NOTE — PROGRESS NOTE ADULT - SUBJECTIVE AND OBJECTIVE BOX
Patient is a 68y old  Male who presents with a chief complaint of chest pain (03 Aug 2018 18:19)      INTERVAL HPI/OVERNIGHT EVENTS:  np chest pain  MEDICATIONS  (STANDING):  amLODIPine   Tablet 10 milliGRAM(s) Oral daily  aspirin enteric coated 81 milliGRAM(s) Oral daily  enoxaparin Injectable 40 milliGRAM(s) SubCutaneous every 24 hours  hydrochlorothiazide 12.5 milliGRAM(s) Oral daily  tamsulosin 0.4 milliGRAM(s) Oral at bedtime    MEDICATIONS  (PRN):  acetaminophen   Tablet. 650 milliGRAM(s) Oral every 6 hours PRN Mild Pain (1 - 3)      Allergies    No Known Allergies    Intolerances        REVIEW OF SYSTEMS:  CONSTITUTIONAL: No fever, weight loss, or fatigue  EYES: No eye pain, visual disturbances, or discharge  ENMT:  No difficulty hearing, tinnitus, vertigo; No sinus or throat pain  NECK: No pain or stiffness  BREASTS: No pain, masses, or nipple discharge  RESPIRATORY: No cough, wheezing, chills or hemoptysis; No shortness of breath  CARDIOVASCULAR: No chest pain, palpitations, dizziness, or leg swelling  GASTROINTESTINAL: No abdominal or epigastric pain. No nausea, vomiting, or hematemesis; No diarrhea or constipation. No melena or hematochezia.  GENITOURINARY: No dysuria, frequency, hematuria, or incontinence  NEUROLOGICAL: No headaches, memory loss, loss of strength, numbness, or tremors  SKIN: No itching, burning, rashes, or lesions   LYMPH NODES: No enlarged glands  ENDOCRINE: No heat or cold intolerance; No hair loss  MUSCULOSKELETAL: No joint pain or swelling; No muscle, back, or extremity pain  PSYCHIATRIC: No depression, anxiety, mood swings, or difficulty sleeping  HEME/LYMPH: No easy bruising, or bleeding gums  ALLERGY AND IMMUNOLOGIC: No hives or eczema    Vital Signs Last 24 Hrs  T(C): 36.1 (04 Aug 2018 05:58), Max: 36.7 (03 Aug 2018 20:50)  T(F): 97 (04 Aug 2018 05:58), Max: 98 (03 Aug 2018 20:50)  HR: 58 (04 Aug 2018 05:58) (55 - 65)  BP: 120/83 (04 Aug 2018 05:58) (120/83 - 171/101)  BP(mean): --  RR: 17 (04 Aug 2018 05:58) (16 - 18)  SpO2: 99% (04 Aug 2018 05:58) (93% - 99%)    PHYSICAL EXAM:  GENERAL: NAD, well-groomed, well-developed  HEAD:  Atraumatic, Normocephalic  EYES: EOMI, PERRLA, conjunctiva and sclera clear  ENMT: No tonsillar erythema, exudates, or enlargement; Moist mucous membranes, Good dentition, No lesions  NECK: Supple, No JVD, Normal thyroid  NERVOUS SYSTEM:  Alert & Oriented X3, Good concentration; Motor Strength 5/5 B/L upper and lower extremities; DTRs 2+ intact and symmetric  CHEST/LUNG: Clear to percussion bilaterally; No rales, rhonchi, wheezing, or rubs  HEART: Regular rate and rhythm; No murmurs, rubs, or gallops  ABDOMEN: Soft, Nontender, Nondistended; Bowel sounds present  EXTREMITIES:  2+ Peripheral Pulses, No clubbing, cyanosis, or edema  LYMPH: No lymphadenopathy noted  SKIN: No rashes or lesions    LABS:                        14.2   3.65  )-----------( 199      ( 03 Aug 2018 11:30 )             42.2     08-03    142  |  107  |  12  ----------------------------<  101<H>  3.5   |  30  |  1.42<H>    Ca    8.5      03 Aug 2018 11:30  Mg     2.3     08-03    TPro  6.9  /  Alb  3.4  /  TBili  0.7  /  DBili  x   /  AST  15  /  ALT  16  /  AlkPhos  37<L>  08-03    PT/INR - ( 03 Aug 2018 11:30 )   PT: 11.0 sec;   INR: 1.01 ratio         PTT - ( 03 Aug 2018 11:30 )  PTT:31.1 sec    CAPILLARY BLOOD GLUCOSE        CULTURES:    HEMOGLOBIN A1C:    CHOLESTEROL:        RADIOLOGY & ADDITIONAL TESTS:

## 2018-08-04 NOTE — CONSULT NOTE ADULT - SUBJECTIVE AND OBJECTIVE BOX
CHIEF COMPLAINT:  Patient is a 68y old  Male who presents with a chief complaint of chest pain (03 Aug 2018 18:19)      HPI:  68m hx htn pw cp. Patient notes 3 days he developed left sided cp. Not associated with sob, diaphoresis, cough, nausea or vomiting. he did not take anything for symptoms.     ALLERGIES:  No Known Allergies    Home Medications:  amLODIPine 10 mg oral tablet: 1 tab(s) orally once a day (30 Nov 2017 21:15)  aspirin 81 mg oral tablet: 1 tab(s) orally once a day (30 Nov 2017 21:15)  hydroCHLOROthiazide 12.5 mg oral tablet: 1 tab(s) orally once a day (30 Nov 2017 21:15)  tamsulosin 0.4 mg oral capsule: 1 cap(s) orally once a day (30 Nov 2017 21:15)    PAST MEDICAL & SURGICAL HISTORY:  Muscle cramps: (on gabapentin)  Benign nodular prostatic hyperplasia without lower urinary tract symptoms  Essential hypertension  No significant past surgical history      FAMILY HISTORY:  Family history of hypertension (Father)      SOCIAL HISTORY:  nonsmoker    REVIEW OF SYSTEMS:  General:  No wt loss, fevers, chills, night sweats  Eyes:  Good vision, no reported pain  ENT:  No sore throat, pain, runny nose, dysphagia  CV:  No pain, palpitations, hypo/hypertension  Resp:  No dyspnea, cough, tachypnea, wheezing  GI:  No pain, nausea, vomiting, diarrhea, constipation  :  No pain, bleeding, incontinence, nocturia  Muscle:  No pain, weakness  Breast:  No pain, abscess, mass, discharge  Neuro:  No weakness, tingling, memory problems  Psych:  No fatigue, insomnia, mood problems, depression  Endocrine:  No polyuria, polydipsia, cold/heat intolerance  Heme:  No petechiae, ecchymosis, easy bruisability  Skin:  No rash, edema    PHYSICAL EXAM:  Vital Signs:  Vital Signs Last 24 Hrs  T(C): 36.3 (04 Aug 2018 11:30), Max: 36.7 (03 Aug 2018 20:50)  T(F): 97.4 (04 Aug 2018 11:30), Max: 98 (03 Aug 2018 20:50)  HR: 66 (04 Aug 2018 11:30) (55 - 66)  BP: 108/69 (04 Aug 2018 11:30) (108/69 - 171/101)  RR: 17 (04 Aug 2018 11:30) (16 - 18)  SpO2: 97% (04 Aug 2018 11:30) (93% - 99%)  I&O's Summary      Tele: SR  General:  Appears stated age, well-groomed, well-nourished, no distress  HEENT:  NC/AT, patent nares w/ pink mucosa, OP clear w/o lesions, conjunctivae clear, no thyromegaly, nodules, adenopathy, no JVD  Chest:  Full & symmetric excursion, no increased effort, breath sounds clear  Cardiovascular:  Regular rhythm, S1, S2, no murmur  Abdomen:  Soft, non-tender, non-distended, normoactive bowel sounds  Extremities:  no edema  Skin:  warm/dry  Musculoskeletal:  Full ROM in all joints w/o swelling/tenderness/effusion  Neuro/Psych:  Alert, oriented    LABORATORY:                          14.2   3.65  )-----------( 199      ( 03 Aug 2018 11:30 )             42.2     08-03    142  |  107  |  12  ----------------------------<  101<H>  3.5   |  30  |  1.42<H>    Ca    8.5      03 Aug 2018 11:30  Mg     2.3     08-03    TPro  6.9  /  Alb  3.4  /  TBili  0.7  /  DBili  x   /  AST  15  /  ALT  16  /  AlkPhos  37<L>  08-03      CARDIAC MARKERS ( 04 Aug 2018 03:05 )  <.015 ng/mL / x     / x     / x     / <1.0 ng/mL  CARDIAC MARKERS ( 03 Aug 2018 20:01 )  <.015 ng/mL / x     / x     / x     / <1.0 ng/mL  CARDIAC MARKERS ( 03 Aug 2018 11:30 )  <.015 ng/mL / x     / x     / x     / <1.0 ng/mL      LIVER FUNCTIONS - ( 03 Aug 2018 11:30 )  Alb: 3.4 g/dL / Pro: 6.9 gm/dL / ALK PHOS: 37 U/L / ALT: 16 U/L / AST: 15 U/L / GGT: x           PT/INR - ( 03 Aug 2018 11:30 )   PT: 11.0 sec;   INR: 1.01 ratio         PTT - ( 03 Aug 2018 11:30 )  PTT:31.1 sec    IMAGING:  ecg: SR T wave abnormality. Low voltage.    < from: CT Angio Chest w/ IV Cont (08.03.18 @ 13:31) >    Negative for pulmonary embolism.    < end of copied text >    ASSESSMENT:  68m hx htn pw cp. Patient notes 3 days he developed left sided cp. Not associated with sob, diaphoresis, cough, nausea or vomiting. he did not take anything for symptoms. His current chest pain do not clearly sound cardiac in nature.    PLAN:     Continue telemetry to monitor for dysrhythmia.    Stay on:  MEDICATIONS  (STANDING):  amLODIPine   Tablet 10 milliGRAM(s) Oral daily  aspirin enteric coated 81 milliGRAM(s) Oral daily  enoxaparin Injectable 40 milliGRAM(s) SubCutaneous every 24 hours  hydrochlorothiazide 12.5 milliGRAM(s) Oral daily  tamsulosin 0.4 milliGRAM(s) Oral at bedtime    Echo pending.  Hopefully short hospital stay and outpt f/u if no further significant symptoms and acceptable echo.    Cruz Martin MD, FACC, SHELIA, ERINC, FACP  Director, Heart Failure Services  [facility]  , Department of Cardiology  [employer]

## 2018-08-05 PROCEDURE — 99232 SBSQ HOSP IP/OBS MODERATE 35: CPT

## 2018-08-05 RX ORDER — IBUPROFEN 200 MG
600 TABLET ORAL ONCE
Qty: 0 | Refills: 0 | Status: COMPLETED | OUTPATIENT
Start: 2018-08-05 | End: 2018-08-05

## 2018-08-05 RX ORDER — OXYCODONE AND ACETAMINOPHEN 5; 325 MG/1; MG/1
1 TABLET ORAL ONCE
Qty: 0 | Refills: 0 | Status: DISCONTINUED | OUTPATIENT
Start: 2018-08-05 | End: 2018-08-05

## 2018-08-05 RX ORDER — NITROGLYCERIN 6.5 MG
0.5 CAPSULE, EXTENDED RELEASE ORAL ONCE
Qty: 0 | Refills: 0 | Status: COMPLETED | OUTPATIENT
Start: 2018-08-05 | End: 2018-08-05

## 2018-08-05 RX ADMIN — TAMSULOSIN HYDROCHLORIDE 0.4 MILLIGRAM(S): 0.4 CAPSULE ORAL at 22:24

## 2018-08-05 RX ADMIN — OXYCODONE AND ACETAMINOPHEN 1 TABLET(S): 5; 325 TABLET ORAL at 12:25

## 2018-08-05 RX ADMIN — Medication 81 MILLIGRAM(S): at 11:00

## 2018-08-05 RX ADMIN — OXYCODONE AND ACETAMINOPHEN 1 TABLET(S): 5; 325 TABLET ORAL at 11:00

## 2018-08-05 RX ADMIN — Medication 0.5 INCH(S): at 11:00

## 2018-08-05 RX ADMIN — Medication 12.5 MILLIGRAM(S): at 05:24

## 2018-08-05 RX ADMIN — Medication 600 MILLIGRAM(S): at 17:00

## 2018-08-05 RX ADMIN — Medication 0.5 INCH(S): at 23:00

## 2018-08-05 RX ADMIN — ENOXAPARIN SODIUM 40 MILLIGRAM(S): 100 INJECTION SUBCUTANEOUS at 05:24

## 2018-08-05 RX ADMIN — AMLODIPINE BESYLATE 10 MILLIGRAM(S): 2.5 TABLET ORAL at 05:24

## 2018-08-05 NOTE — PROGRESS NOTE ADULT - SUBJECTIVE AND OBJECTIVE BOX
HPI:  68m hx htn pw cp. Patient notes 3 days he developed left sided cp. Not associated with sob, diaphoresis, cough, nausea or vomiting. he did not take anything for symptoms. Seen resting earlier today.    PAST MEDICAL & SURGICAL HISTORY:  Muscle cramps: (on gabapentin)  Benign nodular prostatic hyperplasia without lower urinary tract symptoms  Essential hypertension  No significant past surgical history      REVIEW OF SYSTEMS:  General:  No wt loss, fevers, chills, night sweats  Eyes:  Good vision, no reported pain  ENT:  No sore throat, pain, runny nose, dysphagia  CV:  No pain, palpitations, hypo/hypertension  Resp:  No dyspnea, cough, tachypnea, wheezing  GI:  No pain, nausea, vomiting, diarrhea, constipation  :  No pain, bleeding, incontinence, nocturia  Muscle:  No pain, weakness  Breast:  No pain, abscess, mass, discharge  Neuro:  No weakness, tingling, memory problems  Psych:  No fatigue, insomnia, mood problems, depression  Endocrine:  No polyuria, polydipsia, cold/heat intolerance  Heme:  No petechiae, ecchymosis, easy bruisability  Skin:  No rash, edema    PHYSICAL EXAM:  Vital Signs:  Vital Signs Last 24 Hrs  Vital Signs Last 24 Hrs  T(C): 35.9 (05 Aug 2018 17:03), Max: 36.6 (04 Aug 2018 17:33)  T(F): 96.7 (05 Aug 2018 17:03), Max: 97.9 (05 Aug 2018 01:15)  HR: 59 (05 Aug 2018 17:03) (59 - 72)  BP: 141/84 (05 Aug 2018 17:03) (116/75 - 142/96)  RR: 16 (05 Aug 2018 17:03) (16 - 18)  SpO2: 95% (05 Aug 2018 17:03) (94% - 100%)      Tele: SR, briefly ST  General:  Appears stated age, well-groomed, well-nourished, no distress  HEENT:  NC/AT, patent nares w/ pink mucosa, OP clear w/o lesions, conjunctivae clear, no thyromegaly, nodules, adenopathy, no JVD  Chest:  Full & symmetric excursion, no increased effort, breath sounds clear  Cardiovascular:  Regular rhythm, S1, S2, no murmur  Abdomen:  Soft, non-tender, non-distended, normoactive bowel sounds  Extremities:  no edema  Skin:  warm/dry  Musculoskeletal:  Full ROM in all joints w/o swelling/tenderness/effusion  Neuro/Psych:  Alert, oriented    LABORATORY:      IMAGING:  ecg: SR T wave abnormality. Low voltage.    < from: CT Angio Chest w/ IV Cont (08.03.18 @ 13:31) >    Negative for pulmonary embolism.    < end of copied text >    ASSESSMENT:  68m hx htn pw cp. Patient notes 3 days he developed left sided cp. Not associated with sob, diaphoresis, cough, nausea or vomiting.     PLAN:     Continue telemetry.  MEDICATIONS  (STANDING):  amLODIPine   Tablet 10 milliGRAM(s) Oral daily  aspirin enteric coated 81 milliGRAM(s) Oral daily  enoxaparin Injectable 40 milliGRAM(s) SubCutaneous every 24 hours  hydrochlorothiazide 12.5 milliGRAM(s) Oral daily  tamsulosin 0.4 milliGRAM(s) Oral at bedtime    echo pending.    Cruz Martin MD, FACC, FASSNOW, FASNC, FACP  Director, Heart Failure Services  [facility]  , Department of Cardiology  [facility] [place] [facility]

## 2018-08-05 NOTE — PROGRESS NOTE ADULT - SUBJECTIVE AND OBJECTIVE BOX
Patient is a 68y old  Male who presents with a chief complaint of chest pain (03 Aug 2018 18:19)      INTERVAL HPI/OVERNIGHT EVENTS:  pt is doing better, no chest pain  MEDICATIONS  (STANDING):  amLODIPine   Tablet 10 milliGRAM(s) Oral daily  aspirin enteric coated 81 milliGRAM(s) Oral daily  enoxaparin Injectable 40 milliGRAM(s) SubCutaneous every 24 hours  hydrochlorothiazide 12.5 milliGRAM(s) Oral daily  tamsulosin 0.4 milliGRAM(s) Oral at bedtime    MEDICATIONS  (PRN):  acetaminophen   Tablet. 650 milliGRAM(s) Oral every 6 hours PRN Mild Pain (1 - 3)      Allergies    No Known Allergies    Intolerances        REVIEW OF SYSTEMS:  CONSTITUTIONAL: No fever, weight loss, or fatigue  EYES: No eye pain, visual disturbances, or discharge  ENMT:  No difficulty hearing, tinnitus, vertigo; No sinus or throat pain  NECK: No pain or stiffness  BREASTS: No pain, masses, or nipple discharge  RESPIRATORY: No cough, wheezing, chills or hemoptysis; No shortness of breath  CARDIOVASCULAR: No chest pain, palpitations, dizziness, or leg swelling  GASTROINTESTINAL: No abdominal or epigastric pain. No nausea, vomiting, or hematemesis; No diarrhea or constipation. No melena or hematochezia.  GENITOURINARY: No dysuria, frequency, hematuria, or incontinence  NEUROLOGICAL: No headaches, memory loss, loss of strength, numbness, or tremors  SKIN: No itching, burning, rashes, or lesions   LYMPH NODES: No enlarged glands  ENDOCRINE: No heat or cold intolerance; No hair loss  MUSCULOSKELETAL: No joint pain or swelling; No muscle, back, or extremity pain  PSYCHIATRIC: No depression, anxiety, mood swings, or difficulty sleeping  HEME/LYMPH: No easy bruising, or bleeding gums  ALLERGY AND IMMUNOLOGIC: No hives or eczema    Vital Signs Last 24 Hrs  T(C): 35.9 (05 Aug 2018 17:03), Max: 36.6 (05 Aug 2018 01:15)  T(F): 96.7 (05 Aug 2018 17:03), Max: 97.9 (05 Aug 2018 01:15)  HR: 59 (05 Aug 2018 17:03) (59 - 72)  BP: 141/84 (05 Aug 2018 17:03) (116/75 - 142/96)  BP(mean): --  RR: 16 (05 Aug 2018 17:03) (16 - 18)  SpO2: 95% (05 Aug 2018 17:03) (94% - 100%)    PHYSICAL EXAM:  GENERAL: NAD, well-groomed, well-developed  HEAD:  Atraumatic, Normocephalic  EYES: EOMI, PERRLA, conjunctiva and sclera clear  ENMT: No tonsillar erythema, exudates, or enlargement; Moist mucous membranes, Good dentition, No lesions  NECK: Supple, No JVD, Normal thyroid  NERVOUS SYSTEM:  Alert & Oriented X3, Good concentration; Motor Strength 5/5 B/L upper and lower extremities; DTRs 2+ intact and symmetric  CHEST/LUNG: Clear to percussion bilaterally; No rales, rhonchi, wheezing, or rubs  HEART: Regular rate and rhythm; No murmurs, rubs, or gallops  ABDOMEN: Soft, Nontender, Nondistended; Bowel sounds present  EXTREMITIES:  2+ Peripheral Pulses, No clubbing, cyanosis, or edema  LYMPH: No lymphadenopathy noted  SKIN: No rashes or lesions    LABS:              CAPILLARY BLOOD GLUCOSE        CULTURES:    HEMOGLOBIN A1C:    CHOLESTEROL:        RADIOLOGY & ADDITIONAL TESTS:

## 2018-08-06 VITALS
RESPIRATION RATE: 18 BRPM | TEMPERATURE: 97 F | DIASTOLIC BLOOD PRESSURE: 86 MMHG | SYSTOLIC BLOOD PRESSURE: 132 MMHG | OXYGEN SATURATION: 94 % | HEART RATE: 74 BPM

## 2018-08-06 LAB
ANION GAP SERPL CALC-SCNC: 7 MMOL/L — SIGNIFICANT CHANGE UP (ref 5–17)
BUN SERPL-MCNC: 19 MG/DL — SIGNIFICANT CHANGE UP (ref 7–23)
CALCIUM SERPL-MCNC: 9.1 MG/DL — SIGNIFICANT CHANGE UP (ref 8.5–10.1)
CHLORIDE SERPL-SCNC: 102 MMOL/L — SIGNIFICANT CHANGE UP (ref 96–108)
CO2 SERPL-SCNC: 32 MMOL/L — HIGH (ref 22–31)
CREAT SERPL-MCNC: 1.54 MG/DL — HIGH (ref 0.5–1.3)
GLUCOSE SERPL-MCNC: 95 MG/DL — SIGNIFICANT CHANGE UP (ref 70–99)
HCT VFR BLD CALC: 47.2 % — SIGNIFICANT CHANGE UP (ref 39–50)
HGB BLD-MCNC: 15.9 G/DL — SIGNIFICANT CHANGE UP (ref 13–17)
MCHC RBC-ENTMCNC: 31.4 PG — SIGNIFICANT CHANGE UP (ref 27–34)
MCHC RBC-ENTMCNC: 33.7 GM/DL — SIGNIFICANT CHANGE UP (ref 32–36)
MCV RBC AUTO: 93.3 FL — SIGNIFICANT CHANGE UP (ref 80–100)
NRBC # BLD: 0 /100 WBCS — SIGNIFICANT CHANGE UP (ref 0–0)
PLATELET # BLD AUTO: 223 K/UL — SIGNIFICANT CHANGE UP (ref 150–400)
POTASSIUM SERPL-MCNC: 3 MMOL/L — LOW (ref 3.5–5.3)
POTASSIUM SERPL-SCNC: 3 MMOL/L — LOW (ref 3.5–5.3)
RBC # BLD: 5.06 M/UL — SIGNIFICANT CHANGE UP (ref 4.2–5.8)
RBC # FLD: 13.2 % — SIGNIFICANT CHANGE UP (ref 10.3–14.5)
SODIUM SERPL-SCNC: 141 MMOL/L — SIGNIFICANT CHANGE UP (ref 135–145)
WBC # BLD: 4.34 K/UL — SIGNIFICANT CHANGE UP (ref 3.8–10.5)
WBC # FLD AUTO: 4.34 K/UL — SIGNIFICANT CHANGE UP (ref 3.8–10.5)

## 2018-08-06 RX ORDER — TAMSULOSIN HYDROCHLORIDE 0.4 MG/1
1 CAPSULE ORAL
Qty: 0 | Refills: 0 | COMMUNITY

## 2018-08-06 RX ORDER — AMLODIPINE BESYLATE 2.5 MG/1
1 TABLET ORAL
Qty: 30 | Refills: 0
Start: 2018-08-06 | End: 2018-09-04

## 2018-08-06 RX ORDER — ASPIRIN/CALCIUM CARB/MAGNESIUM 324 MG
1 TABLET ORAL
Qty: 30 | Refills: 0
Start: 2018-08-06 | End: 2018-09-04

## 2018-08-06 RX ORDER — ASPIRIN/CALCIUM CARB/MAGNESIUM 324 MG
1 TABLET ORAL
Qty: 0 | Refills: 0 | COMMUNITY

## 2018-08-06 RX ORDER — POTASSIUM CHLORIDE 20 MEQ
40 PACKET (EA) ORAL ONCE
Qty: 0 | Refills: 0 | Status: COMPLETED | OUTPATIENT
Start: 2018-08-06 | End: 2018-08-06

## 2018-08-06 RX ORDER — TAMSULOSIN HYDROCHLORIDE 0.4 MG/1
1 CAPSULE ORAL
Qty: 30 | Refills: 0
Start: 2018-08-06 | End: 2018-09-04

## 2018-08-06 RX ADMIN — Medication 81 MILLIGRAM(S): at 11:05

## 2018-08-06 RX ADMIN — ENOXAPARIN SODIUM 40 MILLIGRAM(S): 100 INJECTION SUBCUTANEOUS at 06:43

## 2018-08-06 RX ADMIN — Medication 12.5 MILLIGRAM(S): at 11:05

## 2018-08-06 RX ADMIN — AMLODIPINE BESYLATE 10 MILLIGRAM(S): 2.5 TABLET ORAL at 06:43

## 2018-08-06 RX ADMIN — Medication 40 MILLIEQUIVALENT(S): at 11:08

## 2018-08-06 NOTE — DISCHARGE NOTE ADULT - HOSPITAL COURSE
68m hx htn pw cp. Patient notes 3 days he developed left sided cp. cardiac enzyme negative and echo is normal

## 2018-08-06 NOTE — DISCHARGE NOTE ADULT - PATIENT PORTAL LINK FT
You can access the [facility] Patient Portal, offered by [facility], by registering with the following website: [email or web address]

## 2018-08-06 NOTE — DISCHARGE NOTE ADULT - CARE PLAN
Principal Discharge DX:	Other chest pain  Goal:	follow up with pmd in 1 week  Assessment and plan of treatment:	take med as directed  Secondary Diagnosis:	Essential hypertension  Secondary Diagnosis:	Benign nodular prostatic hyperplasia without lower urinary tract symptoms

## 2018-08-10 DIAGNOSIS — I10 ESSENTIAL (PRIMARY) HYPERTENSION: ICD-10-CM

## 2018-08-10 DIAGNOSIS — R07.9 CHEST PAIN, UNSPECIFIED: ICD-10-CM

## 2018-08-10 DIAGNOSIS — R25.2 CRAMP AND SPASM: ICD-10-CM

## 2018-08-10 DIAGNOSIS — Z79.82 LONG TERM (CURRENT) USE OF ASPIRIN: ICD-10-CM

## 2018-08-10 DIAGNOSIS — N40.0 BENIGN PROSTATIC HYPERPLASIA WITHOUT LOWER URINARY TRACT SYMPTOMS: ICD-10-CM

## 2019-01-02 NOTE — H&P ADULT - NSHPOUTPATIENTPROVIDERS_GEN_ALL_CORE
CC:   Chief Complaint   Patient presents with   • Ear Problem     left ear popping x 4 days, starting to get painful        Sweta Caruso is a 21 year old female  with a history of L ear pain.        Onset 2 days    Cough (-)    Coryza (+)    Sore throat( -)    Fever (- )    Diarrhea (-)  vomiting (--)  abdominal pain (-) Nausea (-)    Pain with movement of the pinna (-)    There is no problem list on file for this patient.      Current Outpatient Medications   Medication Sig   • lisdexamfetamine (VYVANSE) 30 MG capsule Take 1 tablet by mouth daily.   • amoxicillin (AMOXIL) 875 MG tablet Take 1 tablet by mouth 2 times daily for 10 days.       FH no one is ill      ALLERGIES:   Allergen Reactions   • Nitrofurantoin SHORTNESS OF BREATH     Shortness of breath         OBJECTIVE:   Visit Vitals  /63   Pulse 78   Temp 99.3 °F (37.4 °C) (Tympanic)   Resp 16   LMP 12/18/2018 (Approximate)   SpO2 98%     Gen: alert, fully oriented and NAD    HEENT: No eye redness, photophobia, or pain  Redness  of the turbinates (+)    Redness Throat (+).   No exudate.   TMs  Left TM bulgy, red , no LM  .  Right retracted with LM and fluid     NECK:  Supple, no mass. Nontender nodes  CHEST: Clear to auscultation  HEART: regular, normal S1,S2, no murmur      ASSESSMENT:   LOM  URI    PLAN:   OTC meds     Amoxicillin as ordered. Eat yogurt or take a probiotic to protect intestinal and genital tracts.   For typical viral illness expect in general 4-7 days of sore throat and fever and 7-10 days of cough.     Pt to call back if no change or worse  PT voiced understanding of instructions given and  felt that  we accomplished everything she needed to do today. I asked her to call me if there are any further questions or concerns.       Kei العراقي MD    
Dr Menjviar

## 2020-11-09 ENCOUNTER — EMERGENCY (EMERGENCY)
Facility: HOSPITAL | Age: 70
LOS: 1 days | Discharge: HOME | End: 2020-11-09
Attending: EMERGENCY MEDICINE | Admitting: EMERGENCY MEDICINE
Payer: MEDICARE

## 2020-11-09 VITALS
RESPIRATION RATE: 20 BRPM | DIASTOLIC BLOOD PRESSURE: 134 MMHG | HEART RATE: 83 BPM | OXYGEN SATURATION: 98 % | TEMPERATURE: 98 F | SYSTOLIC BLOOD PRESSURE: 210 MMHG | HEIGHT: 65 IN

## 2020-11-09 VITALS
TEMPERATURE: 97 F | SYSTOLIC BLOOD PRESSURE: 153 MMHG | DIASTOLIC BLOOD PRESSURE: 91 MMHG | OXYGEN SATURATION: 100 % | RESPIRATION RATE: 18 BRPM | HEART RATE: 55 BPM

## 2020-11-09 LAB
ANION GAP SERPL CALC-SCNC: 13 MMO/L — SIGNIFICANT CHANGE UP (ref 7–14)
APPEARANCE UR: CLEAR — SIGNIFICANT CHANGE UP
BACTERIA # UR AUTO: NEGATIVE — SIGNIFICANT CHANGE UP
BILIRUB UR-MCNC: NEGATIVE — SIGNIFICANT CHANGE UP
BLOOD UR QL VISUAL: SIGNIFICANT CHANGE UP
BUN SERPL-MCNC: 15 MG/DL — SIGNIFICANT CHANGE UP (ref 7–23)
CALCIUM SERPL-MCNC: 9.2 MG/DL — SIGNIFICANT CHANGE UP (ref 8.4–10.5)
CHLORIDE SERPL-SCNC: 105 MMOL/L — SIGNIFICANT CHANGE UP (ref 98–107)
CO2 SERPL-SCNC: 20 MMOL/L — LOW (ref 22–31)
COLOR SPEC: COLORLESS — SIGNIFICANT CHANGE UP
CREAT SERPL-MCNC: 1.29 MG/DL — SIGNIFICANT CHANGE UP (ref 0.5–1.3)
GLUCOSE SERPL-MCNC: 154 MG/DL — HIGH (ref 70–99)
GLUCOSE UR-MCNC: NEGATIVE — SIGNIFICANT CHANGE UP
HYALINE CASTS # UR AUTO: NEGATIVE — SIGNIFICANT CHANGE UP
KETONES UR-MCNC: NEGATIVE — SIGNIFICANT CHANGE UP
LEUKOCYTE ESTERASE UR-ACNC: SIGNIFICANT CHANGE UP
NITRITE UR-MCNC: NEGATIVE — SIGNIFICANT CHANGE UP
PH UR: 7 — SIGNIFICANT CHANGE UP (ref 5–8)
POTASSIUM SERPL-MCNC: 3.8 MMOL/L — SIGNIFICANT CHANGE UP (ref 3.5–5.3)
POTASSIUM SERPL-SCNC: 3.8 MMOL/L — SIGNIFICANT CHANGE UP (ref 3.5–5.3)
PROT UR-MCNC: 20 — SIGNIFICANT CHANGE UP
RBC CASTS # UR COMP ASSIST: SIGNIFICANT CHANGE UP (ref 0–?)
SODIUM SERPL-SCNC: 138 MMOL/L — SIGNIFICANT CHANGE UP (ref 135–145)
SP GR SPEC: 1.01 — SIGNIFICANT CHANGE UP (ref 1–1.04)
SQUAMOUS # UR AUTO: SIGNIFICANT CHANGE UP
UROBILINOGEN FLD QL: NORMAL — SIGNIFICANT CHANGE UP
WBC UR QL: SIGNIFICANT CHANGE UP (ref 0–?)

## 2020-11-09 PROCEDURE — 99283 EMERGENCY DEPT VISIT LOW MDM: CPT | Mod: GC

## 2020-11-09 NOTE — ED PROVIDER NOTE - NSFOLLOWUPINSTRUCTIONS_ED_ALL_ED_FT
Follow up with your PCP in 24-48 hours.   Call urology to make an appointment this week.   Return to the ER if you develop any new or worsening symptoms such as fever, inability to urinate, chest pain, shortness of breath, numbness, weakness, abdominal pain, nausea, vomiting, or visual changes.

## 2020-11-09 NOTE — ED PROVIDER NOTE - PATIENT PORTAL LINK FT
You can access the FollowMyHealth Patient Portal offered by [facility] by registering at the following website: [email or web address]. By joining [employer]’s FollowMyHealth portal, you will also be able to view your health information using other applications (apps) compatible with our system.

## 2020-11-09 NOTE — ED PROVIDER NOTE - NS ED ROS FT
ROS:  GENERAL: No fever, no chills  EYES: no change in vision  HEENT: no trouble swallowing, no trouble speaking  CARDIAC: no chest pain  PULMONARY: no cough, no shortness of breath  GI: +suprapubic pressure. no abdominal pain, no nausea, no vomiting, no diarrhea, no constipation  : +inability to urinate. No dysuria, no frequency, no change in appearance, or odor of urine  SKIN: no rashes  NEURO: no headache, no weakness  MSK: No joint pain    Zaheer Montalvo PGY3

## 2020-11-09 NOTE — ED PROVIDER NOTE - ATTENDING CONTRIBUTION TO CARE
Attending Attestation: Dr. Cardoso  I have personally performed a history and physical examination of the patient and discussed management with the resident as well as the patient.  I reviewed the resident's note and agree with the documented findings and plan of care.  I have authored and modified critical sections of the Provider Note, including but not limited to HPI, Physical Exam and MDM. P c BPH p/w urinary retention. No other symptoms. Does have e/o overflow incontinence in ED. Pt well appearing, abd benign except for mild suprapubic tenderness.  PVR > 400 ml.. Will place Giron and assess kidney function/UA.

## 2020-11-09 NOTE — ED PROVIDER NOTE - CLINICAL SUMMARY MEDICAL DECISION MAKING FREE TEXT BOX
Urinary retention. No other symptoms. Pt well appearing, abd benign. Will place Giron and assess kidney function/UA. P c BPH p/w urinary retention. No other symptoms. Does have e/o overflow incontinence in ED. Pt well appearing, abd benign except for mild suprapubic tenderness.  PVR > 400 ml.. Will place Giron and assess kidney function/UA.

## 2020-11-09 NOTE — ED PROVIDER NOTE - PHYSICAL EXAMINATION
Gen: AAOx3, non-toxic  Head: NCAT  HEENT: EOMI, oral mucosa moist, normal conjunctiva  Lung: CTAB  CV: RRR  Abd: soft, NTND  MSK: no visible deformities  Neuro: No focal sensory or motor deficits  Skin: Warm, well perfused, no rash  Psych: normal affect.     Zaheer Montalvo PGY3

## 2020-11-09 NOTE — PROVIDER CONTACT NOTE (OTHER) - ASSESSMENT
Per RN, pt is in need of transportation. SW met with pt. Pt shared that his family is unable to pick him up. Pt confirmed address as [address] [place] [address] and that he has keys to enter home. Transportation coordinated via [employer] ([phone]), estimated  by 6:30AM. MAS  Inv# [id number].

## 2020-11-09 NOTE — ED ADULT NURSE REASSESSMENT NOTE - CONDITION
3:30am coverage rpt from AD[facility] Cisneros s/p urinary retention pt as per report refusing sorenson cath. + urinated 700cc out.

## 2020-11-09 NOTE — ED PROVIDER NOTE - OBJECTIVE STATEMENT
70M with PMH of HTN, HLD, and BPH presents with inability to urinate since 10 PM last night. Associated with suprapubic pressure. Denies any other symptoms. No history of urinary retention. 70 yr old M with PMH of HTN, HLD, and BPH presents with inability to urinate since 10 PM last night. Associated with suprapubic pressure. Denies any other symptoms. Has had at least one simlar episode in the past.  Denies dysuria.  No fever/chills. NO other pain aside form need to urinate. Appears uncomfortable.  Persistently trying to urinate and able ot void small volumes only.

## 2020-11-09 NOTE — ED PROVIDER NOTE - PROGRESS NOTE DETAILS
pt urinated on arrival to ED but US showing 400cc of urine in the bladder. will place sorenson & check cr/ua. pt adamantly refusing catheterization. explained risks of not reliving the retention including kidney failure and death. despite this pt not agree to sorenson. pt urinated small volume on arrival to ED but US showing 400cc of urine in the bladder. will place sorenson & check cr/ua. Pt adamantly refusing catheterization. explained risks of not reliving the retention including kidney failure and death. despite this pt not agree to Sorenson. We reviewed indications, risks/benefits of sorenson and sorenson refusal in English and in Creole using an creole speaking eomployee for limited interpretation and also a creole telephone . He stated pain was his major conern, though we had discussed using [employer]ro[employer] lido prior to catheterization. Says he's had a sorenson before for this reason and that he would never want that again.  Feels that he is urinating well now; though he only urinates around 100 ml every time, he's slowly filled a urinal in ED. Labs reviewed show acceptable Cr and no signs of urinary infection.  Pt absolutely refusing sorenson and will f/u c  as outpt. We invited pt to return at any time for catheterization.  Appears to possess decisional capacity given mental status, understands and accepts risks, and rationale for refusal.

## 2020-11-09 NOTE — ED PROVIDER NOTE - NSFOLLOWUPCLINICS_GEN_ALL_ED_FT
[facility]  Urology  [address] [place] [address] - [address] [place] [address]  Phone: [phone]  Fax:   Follow Up Time:

## 2020-11-09 NOTE — ED ADULT NURSE NOTE - OBJECTIVE STATEMENT
pt presents with urinary retention, on arrival bladder distended  , voiding small fractional amounts of urine

## 2020-11-10 LAB
CULTURE RESULTS: NO GROWTH — SIGNIFICANT CHANGE UP
SPECIMEN SOURCE: SIGNIFICANT CHANGE UP

## 2021-04-18 ENCOUNTER — EMERGENCY (EMERGENCY)
Facility: HOSPITAL | Age: 71
LOS: 0 days | Discharge: HOME | End: 2021-04-18
Attending: STUDENT IN AN ORGANIZED HEALTH CARE EDUCATION/TRAINING PROGRAM
Payer: MEDICARE

## 2021-04-18 VITALS
DIASTOLIC BLOOD PRESSURE: 100 MMHG | RESPIRATION RATE: 18 BRPM | SYSTOLIC BLOOD PRESSURE: 155 MMHG | HEART RATE: 97 BPM | OXYGEN SATURATION: 96 %

## 2021-04-18 VITALS
HEART RATE: 103 BPM | RESPIRATION RATE: 18 BRPM | DIASTOLIC BLOOD PRESSURE: 105 MMHG | HEIGHT: 65 IN | TEMPERATURE: 98 F | SYSTOLIC BLOOD PRESSURE: 161 MMHG | WEIGHT: 160.06 LBS | OXYGEN SATURATION: 97 %

## 2021-04-18 DIAGNOSIS — R33.9 RETENTION OF URINE, UNSPECIFIED: ICD-10-CM

## 2021-04-18 DIAGNOSIS — N40.1 BENIGN PROSTATIC HYPERPLASIA WITH LOWER URINARY TRACT SYMPTOMS: ICD-10-CM

## 2021-04-18 DIAGNOSIS — I10 ESSENTIAL (PRIMARY) HYPERTENSION: ICD-10-CM

## 2021-04-18 DIAGNOSIS — R25.2 CRAMP AND SPASM: ICD-10-CM

## 2021-04-18 LAB
ANION GAP SERPL CALC-SCNC: 6 MMOL/L — SIGNIFICANT CHANGE UP (ref 5–17)
APPEARANCE UR: CLEAR — SIGNIFICANT CHANGE UP
BACTERIA # UR AUTO: ABNORMAL
BASOPHILS # BLD AUTO: 0.02 K/UL — SIGNIFICANT CHANGE UP (ref 0–0.2)
BASOPHILS NFR BLD AUTO: 0.3 % — SIGNIFICANT CHANGE UP (ref 0–2)
BILIRUB UR-MCNC: NEGATIVE — SIGNIFICANT CHANGE UP
BUN SERPL-MCNC: 16 MG/DL — SIGNIFICANT CHANGE UP (ref 7–23)
CALCIUM SERPL-MCNC: 9.1 MG/DL — SIGNIFICANT CHANGE UP (ref 8.5–10.1)
CHLORIDE SERPL-SCNC: 105 MMOL/L — SIGNIFICANT CHANGE UP (ref 96–108)
CO2 SERPL-SCNC: 28 MMOL/L — SIGNIFICANT CHANGE UP (ref 22–31)
COLOR SPEC: YELLOW — SIGNIFICANT CHANGE UP
CREAT SERPL-MCNC: 1.37 MG/DL — HIGH (ref 0.5–1.3)
DIFF PNL FLD: ABNORMAL
EOSINOPHIL # BLD AUTO: 0.03 K/UL — SIGNIFICANT CHANGE UP (ref 0–0.5)
EOSINOPHIL NFR BLD AUTO: 0.5 % — SIGNIFICANT CHANGE UP (ref 0–6)
EPI CELLS # UR: SIGNIFICANT CHANGE UP
GLUCOSE SERPL-MCNC: 133 MG/DL — HIGH (ref 70–99)
GLUCOSE UR QL: 50 MG/DL
HCT VFR BLD CALC: 42.3 % — SIGNIFICANT CHANGE UP (ref 39–50)
HGB BLD-MCNC: 14.3 G/DL — SIGNIFICANT CHANGE UP (ref 13–17)
IMM GRANULOCYTES NFR BLD AUTO: 0.3 % — SIGNIFICANT CHANGE UP (ref 0–1.5)
KETONES UR-MCNC: NEGATIVE — SIGNIFICANT CHANGE UP
LEUKOCYTE ESTERASE UR-ACNC: ABNORMAL
LYMPHOCYTES # BLD AUTO: 0.79 K/UL — LOW (ref 1–3.3)
LYMPHOCYTES # BLD AUTO: 13.7 % — SIGNIFICANT CHANGE UP (ref 13–44)
MCHC RBC-ENTMCNC: 31.2 PG — SIGNIFICANT CHANGE UP (ref 27–34)
MCHC RBC-ENTMCNC: 33.8 GM/DL — SIGNIFICANT CHANGE UP (ref 32–36)
MCV RBC AUTO: 92.2 FL — SIGNIFICANT CHANGE UP (ref 80–100)
MONOCYTES # BLD AUTO: 0.54 K/UL — SIGNIFICANT CHANGE UP (ref 0–0.9)
MONOCYTES NFR BLD AUTO: 9.3 % — SIGNIFICANT CHANGE UP (ref 2–14)
NEUTROPHILS # BLD AUTO: 4.38 K/UL — SIGNIFICANT CHANGE UP (ref 1.8–7.4)
NEUTROPHILS NFR BLD AUTO: 75.9 % — SIGNIFICANT CHANGE UP (ref 43–77)
NITRITE UR-MCNC: NEGATIVE — SIGNIFICANT CHANGE UP
NRBC # BLD: 0 /100 WBCS — SIGNIFICANT CHANGE UP (ref 0–0)
PH UR: 6.5 — SIGNIFICANT CHANGE UP (ref 5–8)
PLATELET # BLD AUTO: 192 K/UL — SIGNIFICANT CHANGE UP (ref 150–400)
POTASSIUM SERPL-MCNC: 3.3 MMOL/L — LOW (ref 3.5–5.3)
POTASSIUM SERPL-SCNC: 3.3 MMOL/L — LOW (ref 3.5–5.3)
PROT UR-MCNC: 100 MG/DL
RBC # BLD: 4.59 M/UL — SIGNIFICANT CHANGE UP (ref 4.2–5.8)
RBC # FLD: 13.5 % — SIGNIFICANT CHANGE UP (ref 10.3–14.5)
RBC CASTS # UR COMP ASSIST: >50 /HPF (ref 0–4)
SODIUM SERPL-SCNC: 139 MMOL/L — SIGNIFICANT CHANGE UP (ref 135–145)
SP GR SPEC: 1.01 — SIGNIFICANT CHANGE UP (ref 1.01–1.02)
UROBILINOGEN FLD QL: NEGATIVE MG/DL — SIGNIFICANT CHANGE UP
WBC # BLD: 5.78 K/UL — SIGNIFICANT CHANGE UP (ref 3.8–10.5)
WBC # FLD AUTO: 5.78 K/UL — SIGNIFICANT CHANGE UP (ref 3.8–10.5)
WBC UR QL: ABNORMAL

## 2021-04-18 PROCEDURE — 99284 EMERGENCY DEPT VISIT MOD MDM: CPT

## 2021-04-18 RX ORDER — CEFPODOXIME PROXETIL 100 MG
1 TABLET ORAL
Qty: 20 | Refills: 0
Start: 2021-04-18 | End: 2021-04-27

## 2021-04-18 RX ORDER — CEFPODOXIME PROXETIL 100 MG
100 TABLET ORAL ONCE
Refills: 0 | Status: COMPLETED | OUTPATIENT
Start: 2021-04-18 | End: 2021-04-18

## 2021-04-18 RX ADMIN — Medication 100 MILLIGRAM(S): at 21:42

## 2021-04-18 NOTE — ED PROVIDER NOTE - NSFOLLOWUPINSTRUCTIONS_ED_ALL_ED_FT
Please  your prescriptions at your preferred pharmacy that you provided today and take as directed.    Follow up with Dr. Emerson within 1-3 days.   ENLARGED PROSTATE (BPH) - AfterCare(R) Instructions(ER/ED)           Enlarged Prostate (BPH)    WHAT YOU NEED TO KNOW:    An enlarged prostate (BPH) develops because the number of prostate cells increases (hyperplasia) or the cells get bigger (hypertrophy). BPH causes urinary system problems that can get worse over time. You can work with healthcare providers and take steps to manage BPH.    Male Reproductive System         DISCHARGE INSTRUCTIONS:    Call your doctor or urologist if:   •You see blood in your urine.      •You are not able to urinate.      •Your bladder feels very full and painful.      •You have new or worsening symptoms.      •You have a fever.      •You have questions or concerns about your condition or care.      Medicines: You may need any of the following:   •Medicines may be used to relax the muscles in your prostate and bladder. This may help you urinate more easily. Medicines may also be used to block the production of a hormone that causes the prostate to get larger. It may help to slow the growth of the prostate or shrink the prostate.      •Diuretics (water pills) may be used to relieve fluid buildup. You will need to take these in the morning or afternoon because they may cause you to urinate more often. Do not take them before bedtime.      •Take your medicine as directed. Contact your healthcare provider if you think your medicine is not helping or if you have side effects. Tell him or her if you are allergic to any medicine. Keep a list of the medicines, vitamins, and herbs you take. Include the amounts, and when and why you take them. Bring the list or the pill bottles to follow-up visits. Carry your medicine list with you in case of an emergency.      What you can do to manage BPH:   •Urinate on a regular schedule. This will train your bladder to hold urine longer. A larger amount of urine may make it easier to urinate.      •Talk to your healthcare provider about all your medicines. This includes prescription and over-the-counter medicines. Some medicines can make BPH worse. Do not start any new medicines before you talk to your provider.      •Drink less liquid during the day. Do not have liquid for several hours before you go to bed at night. Do not drink large amounts of any liquid at one time.      •Limit alcohol and caffeine. These can irritate your bladder and make your symptoms worse.      •Eat less salt. Salt can cause fluid buildup and make it harder to urinate. Examples of salty foods are chips, cured meats, and canned soups. Do not use table salt.      •Elevate your legs if you have swelling. Elevate (raise) your legs above the level of your heart. This can relieve swelling caused by fluid buildup. You may not have to get up in the night to urinate.  Elevate Leg           •Lose weight if you are overweight. Obesity increases your risk for obstructive sleep apnea (MAGGIE). MAGGIE can make you need to get up in the night to urinate. Exercise can help you reach or maintain a healthy weight. A lack of exercise may make BPH symptoms worse. Aim to get at least 30 minutes of exercise on most days of the week.  Walking for Exercise           Follow up with your doctor or urologist as directed: Write down your questions so you remember to ask them during your visits.         Urinary Tract Infection    A urinary tract infection (UTI) is an infection of any part of the urinary tract, which includes the kidneys, ureters, bladder, and urethra. Risk factors include ignoring your need to urinate, wiping back to front if female, being an uncircumcised male, and having diabetes or a weak immune system. Symptoms include frequent urination, pain or burning with urination, foul smelling urine, cloudy urine, pain in the lower abdomen, blood in the urine, and fever. If you were prescribed an antibiotic medicine, take it as told by your health care provider. Do not stop taking the antibiotic even if you start to feel better.    SEEK IMMEDIATE MEDICAL CARE IF YOU HAVE ANY OF THE FOLLOWING SYMPTOMS: severe back or abdominal pain, fever, inability to keep fluids or medicine down, dizziness/lightheadedness, or a change in mental status.

## 2021-04-18 NOTE — ED PROVIDER NOTE - PROGRESS NOTE DETAILS
patient now urinating in ED, >600 cc, POCUS bladder volume 130 cc. given multiple attempts at sorenson, will dc with abx, rec follow up with Dr. Emerson.

## 2021-04-18 NOTE — ED PROVIDER NOTE - NS ED ROS FT
ROS: negative for fever, cough, headache, chest pain, shortness of breath, abd pain, nausea, vomiting, diarrhea, rash, paresthesia, and weakness--all other systems reviewed are negative. +Urinary retention.

## 2021-04-18 NOTE — ED PROVIDER NOTE - CARE PROVIDER_API CALL
Robb Emerson)  Urology  [address] [place] [address]  Phone: [phone]  Fax: [phone]  Follow Up Time:

## 2021-04-18 NOTE — ED PROVIDER NOTE - CLINICAL SUMMARY MEDICAL DECISION MAKING FREE TEXT BOX
Nurse attempted regular sorenson placement, followed by coude catheter, together attempted coude w/16 Luxembourgish without success. Pt able to urinate 300cc after attempted sorenson. POC US bladder still with 200CC+ of urine in bladder, paged urinary PA for consult, likely need sorenson before d/c home, may need transfer to different facility. Nurse attempted regular sorenson placement, followed by coude catheter, together attempted coude w/16 Uzbek without success. Pt able to urinate 300cc after attempted sorenson. POC US bladder still with 200CC+ of urine in bladder, paged surgery PA for consult (no urology in house), likely need sorenson before d/c home if pt remains in retention.

## 2021-04-18 NOTE — ED ADULT NURSE NOTE - OBJECTIVE STATEMENT
A&Ox4, ambulating. c/o unable to urinate since 12pm today, pt states having pelvic pain which has resolved at this time. pt denies fevers/chills/n/v. pt hx BPH.

## 2021-04-18 NOTE — ED PROVIDER NOTE - OBJECTIVE STATEMENT
70 yo M w/PMH of HTN, HLD, BPH presents to the ED BIBA for inability to urinate since this morning. Denies fever/chills, cough, SOB, CP, abdominal pain or N/V/D.

## 2021-04-18 NOTE — ED PROVIDER NOTE - PATIENT PORTAL LINK FT
You can access the FollowMyHealth Patient Portal offered by [facility] by registering at the following website: [email or web address]. By joining [facility]’s FollowMyHealth portal, you will also be able to view your health information using other applications (apps) compatible with our system.

## 2021-04-18 NOTE — ED ADULT NURSE REASSESSMENT NOTE - NS ED NURSE REASSESS COMMENT FT1
3 attempts made to insert sorenson catheter and caudet catheter, to no avail.  Dr. Hernandez at bedside.  surgery to be notified.

## 2021-04-18 NOTE — ED PROVIDER NOTE - PHYSICAL EXAMINATION
VITALS: reviewed  GEN: In acute distress secondary to pain. A & O x 4  HEAD/EYES: NCAT, PERRL, EOMI, anicteric sclerae, no conjunctival pallor  ENT: mucus membranes moist, oropharynx WNL, trachea midline, no JVD  RESP: lungs CTA with equal breath sounds bilaterally, chest wall nontender and atraumatic  CV: heart with reg rhythm S1, S2, no murmur; distal pulses intact and symmetric bilaterally  ABDOMEN: normoactive bowel sounds, soft, nondistended, nontender, no palpable masses  : no CVAT  MSK: extremities atraumatic and nontender, no edema, no asymmetry. the back is without midline or lateral tenderness, there is no spinal deformity or stepoff and the back is ranged painlessly. the neck has no midline tenderness, deformity, or stepoff, and is ranged painlessly.  SKIN: warm, dry, no rash, no bruising, no cyanosis. color appropriate for ethnicity  NEURO: alert, mentating appropriately, no facial asymmetry. gross sensation, motor, coordination are intact  PSYCH: Affect appropriate

## 2021-04-20 LAB
CULTURE RESULTS: NO GROWTH — SIGNIFICANT CHANGE UP
SPECIMEN SOURCE: SIGNIFICANT CHANGE UP

## 2021-05-25 ENCOUNTER — EMERGENCY (EMERGENCY)
Facility: HOSPITAL | Age: 71
LOS: 0 days | Discharge: HOME | End: 2021-05-25
Attending: STUDENT IN AN ORGANIZED HEALTH CARE EDUCATION/TRAINING PROGRAM
Payer: MEDICARE

## 2021-05-25 VITALS
HEIGHT: 65 IN | TEMPERATURE: 98 F | HEART RATE: 107 BPM | SYSTOLIC BLOOD PRESSURE: 172 MMHG | DIASTOLIC BLOOD PRESSURE: 97 MMHG | RESPIRATION RATE: 20 BRPM | OXYGEN SATURATION: 96 %

## 2021-05-25 VITALS
DIASTOLIC BLOOD PRESSURE: 96 MMHG | SYSTOLIC BLOOD PRESSURE: 161 MMHG | TEMPERATURE: 98 F | HEART RATE: 81 BPM | OXYGEN SATURATION: 95 % | RESPIRATION RATE: 18 BRPM

## 2021-05-25 DIAGNOSIS — T83.091A OTHER MECHANICAL COMPLICATION OF INDWELLING URETHRAL CATHETER, INITIAL ENCOUNTER: ICD-10-CM

## 2021-05-25 DIAGNOSIS — R25.2 CRAMP AND SPASM: ICD-10-CM

## 2021-05-25 DIAGNOSIS — Z79.82 LONG TERM (CURRENT) USE OF ASPIRIN: ICD-10-CM

## 2021-05-25 DIAGNOSIS — R14.0 ABDOMINAL DISTENSION (GASEOUS): ICD-10-CM
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
## 2021-05-25 DIAGNOSIS — R33.8 OTHER RETENTION OF URINE: ICD-10-CM

## 2021-05-25 DIAGNOSIS — I10 ESSENTIAL (PRIMARY) HYPERTENSION: ICD-10-CM
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
## 2021-05-25 DIAGNOSIS — R10.9 UNSPECIFIED ABDOMINAL PAIN: ICD-10-CM

## 2021-05-25 DIAGNOSIS — N40.1 BENIGN PROSTATIC HYPERPLASIA WITH LOWER URINARY TRACT SYMPTOMS: ICD-10-CM

## 2021-05-25 LAB
APPEARANCE UR: CLEAR — SIGNIFICANT CHANGE UP
BACTERIA # UR AUTO: ABNORMAL
BILIRUB UR-MCNC: NEGATIVE — SIGNIFICANT CHANGE UP
COLOR SPEC: YELLOW — SIGNIFICANT CHANGE UP
DIFF PNL FLD: ABNORMAL
EPI CELLS # UR: SIGNIFICANT CHANGE UP
GLUCOSE UR QL: 100 MG/DL
KETONES UR-MCNC: NEGATIVE — SIGNIFICANT CHANGE UP
LEUKOCYTE ESTERASE UR-ACNC: ABNORMAL
NITRITE UR-MCNC: NEGATIVE — SIGNIFICANT CHANGE UP
PH UR: 7 — SIGNIFICANT CHANGE UP (ref 5–8)
PROT UR-MCNC: 30 MG/DL
RBC CASTS # UR COMP ASSIST: ABNORMAL /HPF (ref 0–4)
SP GR SPEC: 1 — LOW (ref 1.01–1.02)
UROBILINOGEN FLD QL: NEGATIVE MG/DL — SIGNIFICANT CHANGE UP
WBC UR QL: ABNORMAL

## 2021-05-25 PROCEDURE — 99283 EMERGENCY DEPT VISIT LOW MDM: CPT

## 2021-05-25 NOTE — ED PROVIDER NOTE - CLINICAL SUMMARY MEDICAL DECISION MAKING FREE TEXT BOX
Pt with malfunctioning Giron, fully replaced with drainage of 500 cc of clear urine. Abdominal distention and pain improved. Will test for possible UTI and d/c. Pt has urology follow up . Pt with malfunctioning Giorn, fully replaced with drainage of 500 cc of clear urine. Abdominal distention and pain improved. will d/c with urolgoy follow up, which the patient has

## 2021-05-25 NOTE — ED PROVIDER NOTE - PATIENT PORTAL LINK FT
You can access the FollowMyHealth Patient Portal offered by [facility] by registering at the following website: [email or web address]. By joining [facility]’s FollowMyHealth portal, you will also be able to view your health information using other applications (apps) compatible with our system.

## 2021-05-25 NOTE — ED PROVIDER NOTE - OBJECTIVE STATEMENT
Pt is a 71 year old male w/PMH of HTN, BPH, who presents to the ED today for urinary rentention. Pt had Giron catheter inserted yesterday by urologist. Today no urine was passing into Giron bag, and there was leaking around the tip of catheter by his penis. Pt is also experiencing abdominal pain and distention. Denies CP, headaches, dizziness, nausea, vomit, diarrhea, or fevers.

## 2021-05-25 NOTE — ED PROVIDER NOTE - IV ALTEPLASE EXCL ABS HIDDEN
The source of the bleeding was clearly identified./The area was cauterized with silver nitrate.
show

## 2021-06-11 ENCOUNTER — EMERGENCY (EMERGENCY)
Facility: HOSPITAL | Age: 71
LOS: 0 days | Discharge: HOME | End: 2021-06-11
Payer: MEDICARE

## 2021-06-11 VITALS
HEIGHT: 65 IN | TEMPERATURE: 97 F | SYSTOLIC BLOOD PRESSURE: 160 MMHG | RESPIRATION RATE: 16 BRPM | HEART RATE: 67 BPM | OXYGEN SATURATION: 100 % | DIASTOLIC BLOOD PRESSURE: 90 MMHG

## 2021-06-11 DIAGNOSIS — R25.2 CRAMP AND SPASM: ICD-10-CM

## 2021-06-11 DIAGNOSIS — R33.9 RETENTION OF URINE, UNSPECIFIED: ICD-10-CM

## 2021-06-11 DIAGNOSIS — N40.0 BENIGN PROSTATIC HYPERPLASIA WITHOUT LOWER URINARY TRACT SYMPTOMS: ICD-10-CM

## 2021-06-11 DIAGNOSIS — Z79.82 LONG TERM (CURRENT) USE OF ASPIRIN: ICD-10-CM

## 2021-06-11 DIAGNOSIS — I10 ESSENTIAL (PRIMARY) HYPERTENSION: ICD-10-CM

## 2021-06-11 PROCEDURE — 99283 EMERGENCY DEPT VISIT LOW MDM: CPT

## 2021-06-11 NOTE — ED PROVIDER NOTE - OBJECTIVE STATEMENT
70 y/o male with Pmhx of HTN,BPH on flomax presented to the Ed with complaint of urinary retention x 5 hours. admit to having similar problem in the past, came to the ED for catheter placement. denies fever, chills, back pain, abd pain, N/V/D, rectal bleeding, dysuria, hematuria, frequency,

## 2021-06-11 NOTE — ED PROVIDER NOTE - PATIENT PORTAL LINK FT
You can access the FollowMyHealth Patient Portal offered by [facility] by registering at the following website: [email or web address]. By joining [facility]’s FollowMyHealth portal, you will also be able to view your health information using other applications (apps) compatible with our system.

## 2021-06-11 NOTE — ED ADULT NURSE NOTE - OBJECTIVE STATEMENT
c/o urinary retention since 5pm today, history of BPH, reports burning with urination prior to today. axo4.

## 2021-06-11 NOTE — ED PROVIDER NOTE - NSFOLLOWUPCLINICS_GEN_ALL_ED_FT
[facility]  Urology  [address], [address]  [place] [address]  Phone: [phone]  Fax:   Follow Up Time: 1-3 Days    [facility] - Urology  Urology  [address], [address]rd & [address]th [place] [address]  Phone: [phone]  Fax:   Follow Up Time: 1-3 Days    [facility]  Urology  [address] [place] [address]  Phone: [phone]  Fax: [phone]  Follow Up Time: 1-3 Days

## 2021-06-11 NOTE — ED ADULT NURSE REASSESSMENT NOTE - NS ED NURSE REASSESS COMMENT FT1
16 sorenson placed by Radha RN triage nurse, pt bladder scanned after Sorenson placement, bladder empty, no signs of retention. Pt states relief after sorenson placement, 500ml output noted.

## 2021-06-11 NOTE — ED PROVIDER NOTE - PHYSICAL EXAMINATION
:  normal external male genitalia,  :  normal external male genitalia, no phimosis/paraphimosis, no erythema, no swelling, catheter placed  :  normal external male genitalia, no phimosis/paraphimosis, no erythema, no swelling, catheter placed. chaperone by MEREDITH lacey

## 2021-06-11 NOTE — ED PROVIDER NOTE - CLINICAL SUMMARY MEDICAL DECISION MAKING FREE TEXT BOX
72 y/o male with Pmhx of HTN,BPH on flomax presented to the Ed with complaint of urinary retention x 5 hours.     imp : urinary retention     plan  catheter placed  UO 1300 ml   post blader void US 30 ml   urology referral   patient education  reassess

## 2021-06-11 NOTE — ED PROVIDER NOTE - NSFOLLOWUPINSTRUCTIONS_ED_ALL_ED_FT
Please follow up urgently with urology fo urinary retention with 1-2 day     [facility]  Urology  [address], [address]  [place] [address]  Phone: [phone]  Fax:   Follow Up Time: 1-3 Days    [facility] - Urology  Urology  [address]rd & [address]th [place] [address]  Phone: [phone]  Fax:   Follow Up Time: 1-3 Days    [facility]  Urology  [address] [place] [address]  Phone: [phone]  Fax: [phone]  Follow Up Time: 1-3 Days      please return to the ED for worsening symptoms

## 2021-06-11 NOTE — ED PROVIDER NOTE - CHIEF COMPLAINT
NEW PHARMACY    Enalapril      Last Written Prescription Date: 06/01/17  Last Fill Quantity: 90, # refills: 3  Last Office Visit with [facility] or [facility] prescribing provider: 05/25/17       Potassium   Date Value Ref Range Status   06/05/2017 4.0 3.4 - 5.3 mmol/L Final     Creatinine   Date Value Ref Range Status   06/05/2017 0.72 0.66 - 1.25 mg/dL Final     BP Readings from Last 3 Encounters:   06/07/17 (!) 149/97   06/05/17 127/80   05/25/17 126/68     Potassium Chloride      Last Written Prescription Date: 06/01/17  Last Fill Quantity: 180, # refills: 3  Last Office Visit with [facility], [facility] or [facility] prescribing provider: 05/25/17       Potassium   Date Value Ref Range Status   06/05/2017 4.0 3.4 - 5.3 mmol/L Final     Creatinine   Date Value Ref Range Status   06/05/2017 0.72 0.66 - 1.25 mg/dL Final     BP Readings from Last 3 Encounters:   06/07/17 (!) 149/97   06/05/17 127/80   05/25/17 126/68        The patient is a 71y Male complaining of urinary retention.

## 2021-06-27 NOTE — PATIENT PROFILE ADULT. - NUTRITION PROFILE
[No Acute Distress] : no acute distress [Well Nourished] : well nourished [Well-Appearing] : well-appearing [Well Developed] : well developed [Normal Sclera/Conjunctiva] : normal sclera/conjunctiva [Thyroid Normal, No Nodules] : the thyroid was normal and there were no nodules present [No Respiratory Distress] : no respiratory distress  [No Accessory Muscle Use] : no accessory muscle use [Clear to Auscultation] : lungs were clear to auscultation bilaterally [Normal Rate] : normal rate  [Regular Rhythm] : with a regular rhythm [Normal S1, S2] : normal S1 and S2 [No Murmur] : no murmur heard [No Edema] : there was no peripheral edema [Normal Gait] : normal gait [Alert and Oriented x3] : oriented to person, place, and time [de-identified] : Tenderness over bilateral deltoids. Normal muscle bulk. 5/5 strength. [de-identified] : Intact sensation. 2+ biceps, brachiordialis reflexes. no indicators present

## 2021-09-12 NOTE — ED ADULT NURSE NOTE - NSFALLRSKASSESASSIST_ED_ALL_ED
Prep Survey      Responses   [facility] patient discharged from?  [facility]   Is LACE score < 7 ?  No   Emergency Room discharge w/ pulse ox?  No   Eligibility  [facility]   Date of Admission  09/05/21   Date of Discharge  09/12/21   Discharge Disposition  Home-Health Care [facility]   Discharge diagnosis  Acute UTI,  sepsis   Does the patient have one of the following disease processes/diagnoses(primary or secondary)?  Sepsis   Does the patient have Home health ordered?  Yes   What is the Home health agency?   [facility]   Is there a DME ordered?  No   General alerts for this patient  group home @ [facility]   Prep survey completed?  Yes          Chela Menjivar RN         no

## 2021-10-14 NOTE — PATIENT PROFILE ADULT. - PAIN SCALE PREFERRED, PROFILE
Patient called back and scheduled his colonoscopy with Dr Benjamin on 10/27/2021 at [facility].   numerical 0-10

## 2021-12-15 NOTE — PHYSICAL THERAPY INITIAL EVALUATION ADULT - LEVEL OF INDEPENDENCE: SIT/STAND, REHAB EVAL
supervision Zyclara Pregnancy And Lactation Text: This medication is Pregnancy Category C. It is unknown if this medication is excreted in breast milk.

## 2022-11-15 NOTE — PATIENT PROFILE ADULT. - FUNCTIONAL SCREEN CURRENT LEVEL: AMBULATION, MLM
(0) independent Cephalexin Pregnancy And Lactation Text: This medication is Pregnancy Category B and considered safe during pregnancy.  It is also excreted in breast milk but can be used safely for shorter doses.

## 2024-07-15 ENCOUNTER — EMERGENCY (EMERGENCY)
Facility: HOSPITAL | Age: 74
LOS: 1 days | Discharge: HOME | End: 2024-07-15
Attending: EMERGENCY MEDICINE | Admitting: EMERGENCY MEDICINE
Payer: MEDICARE

## 2024-07-15 VITALS
OXYGEN SATURATION: 100 % | TEMPERATURE: 98 F | DIASTOLIC BLOOD PRESSURE: 106 MMHG | HEART RATE: 93 BPM | SYSTOLIC BLOOD PRESSURE: 154 MMHG | RESPIRATION RATE: 15 BRPM

## 2024-07-15 PROCEDURE — 99284 EMERGENCY DEPT VISIT MOD MDM: CPT

## 2024-07-16 VITALS
DIASTOLIC BLOOD PRESSURE: 86 MMHG | OXYGEN SATURATION: 96 % | SYSTOLIC BLOOD PRESSURE: 134 MMHG | HEART RATE: 68 BPM | RESPIRATION RATE: 18 BRPM | TEMPERATURE: 98 F

## 2024-07-16 LAB
ALBUMIN SERPL ELPH-MCNC: 3.8 G/DL — SIGNIFICANT CHANGE UP (ref 3.3–5)
ALP SERPL-CCNC: 56 U/L — SIGNIFICANT CHANGE UP (ref 40–120)
ALT FLD-CCNC: 13 U/L — SIGNIFICANT CHANGE UP (ref 4–41)
ANION GAP SERPL CALC-SCNC: 18 MMOL/L — HIGH (ref 7–14)
ANISOCYTOSIS BLD QL: SLIGHT — SIGNIFICANT CHANGE UP
APPEARANCE UR: ABNORMAL
AST SERPL-CCNC: 24 U/L — SIGNIFICANT CHANGE UP (ref 4–40)
BACTERIA # UR AUTO: ABNORMAL /HPF
BASOPHILS # BLD AUTO: 0 K/UL — SIGNIFICANT CHANGE UP (ref 0–0.2)
BASOPHILS NFR BLD AUTO: 0 % — SIGNIFICANT CHANGE UP (ref 0–2)
BILIRUB SERPL-MCNC: 0.7 MG/DL — SIGNIFICANT CHANGE UP (ref 0.2–1.2)
BILIRUB UR-MCNC: NEGATIVE — SIGNIFICANT CHANGE UP
BUN SERPL-MCNC: 15 MG/DL — SIGNIFICANT CHANGE UP (ref 7–23)
CALCIUM SERPL-MCNC: 9 MG/DL — SIGNIFICANT CHANGE UP (ref 8.4–10.5)
CAST: 2 /LPF — SIGNIFICANT CHANGE UP (ref 0–4)
CHLORIDE SERPL-SCNC: 104 MMOL/L — SIGNIFICANT CHANGE UP (ref 98–107)
CO2 SERPL-SCNC: 17 MMOL/L — LOW (ref 22–31)
COLOR SPEC: YELLOW — SIGNIFICANT CHANGE UP
CREAT SERPL-MCNC: 1.55 MG/DL — HIGH (ref 0.5–1.3)
DIFF PNL FLD: ABNORMAL
EGFR: 47 ML/MIN/1.73M2 — LOW
EOSINOPHIL # BLD AUTO: 0.03 K/UL — SIGNIFICANT CHANGE UP (ref 0–0.5)
EOSINOPHIL NFR BLD AUTO: 0.9 % — SIGNIFICANT CHANGE UP (ref 0–6)
GIANT PLATELETS BLD QL SMEAR: PRESENT — SIGNIFICANT CHANGE UP
GLUCOSE SERPL-MCNC: 108 MG/DL — HIGH (ref 70–99)
GLUCOSE UR QL: >=1000 MG/DL
HCT VFR BLD CALC: 42.2 % — SIGNIFICANT CHANGE UP (ref 39–50)
HGB BLD-MCNC: 14.6 G/DL — SIGNIFICANT CHANGE UP (ref 13–17)
IANC: 2.04 K/UL — SIGNIFICANT CHANGE UP (ref 1.8–7.4)
KETONES UR-MCNC: ABNORMAL MG/DL
LEUKOCYTE ESTERASE UR-ACNC: ABNORMAL
LYMPHOCYTES # BLD AUTO: 0.67 K/UL — LOW (ref 1–3.3)
LYMPHOCYTES # BLD AUTO: 19.6 % — SIGNIFICANT CHANGE UP (ref 13–44)
MANUAL SMEAR VERIFICATION: SIGNIFICANT CHANGE UP
MCHC RBC-ENTMCNC: 32.7 PG — SIGNIFICANT CHANGE UP (ref 27–34)
MCHC RBC-ENTMCNC: 34.6 GM/DL — SIGNIFICANT CHANGE UP (ref 32–36)
MCV RBC AUTO: 94.6 FL — SIGNIFICANT CHANGE UP (ref 80–100)
MONOCYTES # BLD AUTO: 0.25 K/UL — SIGNIFICANT CHANGE UP (ref 0–0.9)
MONOCYTES NFR BLD AUTO: 7.2 % — SIGNIFICANT CHANGE UP (ref 2–14)
NEUTROPHILS # BLD AUTO: 2.24 K/UL — SIGNIFICANT CHANGE UP (ref 1.8–7.4)
NEUTROPHILS NFR BLD AUTO: 65.2 % — SIGNIFICANT CHANGE UP (ref 43–77)
NITRITE UR-MCNC: NEGATIVE — SIGNIFICANT CHANGE UP
OVALOCYTES BLD QL SMEAR: SLIGHT — SIGNIFICANT CHANGE UP
PH UR: 6.5 — SIGNIFICANT CHANGE UP (ref 5–8)
PLAT MORPH BLD: ABNORMAL
PLATELET # BLD AUTO: 136 K/UL — LOW (ref 150–400)
PLATELET COUNT - ESTIMATE: ABNORMAL
POIKILOCYTOSIS BLD QL AUTO: SLIGHT — SIGNIFICANT CHANGE UP
POLYCHROMASIA BLD QL SMEAR: SLIGHT — SIGNIFICANT CHANGE UP
POTASSIUM SERPL-MCNC: 3.3 MMOL/L — LOW (ref 3.5–5.3)
POTASSIUM SERPL-SCNC: 3.3 MMOL/L — LOW (ref 3.5–5.3)
PROT SERPL-MCNC: 7.3 G/DL — SIGNIFICANT CHANGE UP (ref 6–8.3)
PROT UR-MCNC: 30 MG/DL
RBC # BLD: 4.46 M/UL — SIGNIFICANT CHANGE UP (ref 4.2–5.8)
RBC # FLD: 13.3 % — SIGNIFICANT CHANGE UP (ref 10.3–14.5)
RBC BLD AUTO: ABNORMAL
RBC CASTS # UR COMP ASSIST: 13 /HPF — HIGH (ref 0–4)
SMUDGE CELLS # BLD: PRESENT — SIGNIFICANT CHANGE UP
SODIUM SERPL-SCNC: 139 MMOL/L — SIGNIFICANT CHANGE UP (ref 135–145)
SP GR SPEC: 1.01 — SIGNIFICANT CHANGE UP (ref 1–1.03)
SQUAMOUS # UR AUTO: 0 /HPF — SIGNIFICANT CHANGE UP (ref 0–5)
UROBILINOGEN FLD QL: 0.2 MG/DL — SIGNIFICANT CHANGE UP (ref 0.2–1)
VARIANT LYMPHS # BLD: 7.1 % — HIGH (ref 0–6)
WBC # BLD: 3.44 K/UL — LOW (ref 3.8–10.5)
WBC # FLD AUTO: 3.44 K/UL — LOW (ref 3.8–10.5)
WBC UR QL: 53 /HPF — HIGH (ref 0–5)

## 2024-07-16 RX ORDER — CEFTRIAXONE SODIUM 500 MG
1000 VIAL (EA) INJECTION ONCE
Refills: 0 | Status: COMPLETED | OUTPATIENT
Start: 2024-07-16 | End: 2024-07-16

## 2024-07-16 RX ORDER — CEPHALEXIN 500 MG
1 CAPSULE ORAL
Qty: 14 | Refills: 0
Start: 2024-07-16 | End: 2024-07-22

## 2024-07-16 RX ORDER — POTASSIUM CHLORIDE 600 MG/1
40 TABLET, FILM COATED, EXTENDED RELEASE ORAL ONCE
Refills: 0 | Status: COMPLETED | OUTPATIENT
Start: 2024-07-16 | End: 2024-07-16

## 2024-07-16 RX ADMIN — POTASSIUM CHLORIDE 40 MILLIEQUIVALENT(S): 600 TABLET, FILM COATED, EXTENDED RELEASE ORAL at 01:56

## 2024-07-16 RX ADMIN — Medication 100 MILLIGRAM(S): at 01:56

## 2024-07-19 LAB
-  AMOXICILLIN/CLAVULANIC ACID: SIGNIFICANT CHANGE UP
-  AMPICILLIN/SULBACTAM: SIGNIFICANT CHANGE UP
-  AMPICILLIN: SIGNIFICANT CHANGE UP
-  AZTREONAM: SIGNIFICANT CHANGE UP
-  CEFAZOLIN: SIGNIFICANT CHANGE UP
-  CEFEPIME: SIGNIFICANT CHANGE UP
-  CEFOXITIN: SIGNIFICANT CHANGE UP
-  CEFTRIAXONE: SIGNIFICANT CHANGE UP
-  CEFUROXIME: SIGNIFICANT CHANGE UP
-  CIPROFLOXACIN: SIGNIFICANT CHANGE UP
-  ERTAPENEM: SIGNIFICANT CHANGE UP
-  GENTAMICIN: SIGNIFICANT CHANGE UP
-  IMIPENEM: SIGNIFICANT CHANGE UP
-  LEVOFLOXACIN: SIGNIFICANT CHANGE UP
-  MEROPENEM: SIGNIFICANT CHANGE UP
-  NITROFURANTOIN: SIGNIFICANT CHANGE UP
-  PIPERACILLIN/TAZOBACTAM: SIGNIFICANT CHANGE UP
-  TOBRAMYCIN: SIGNIFICANT CHANGE UP
-  TRIMETHOPRIM/SULFAMETHOXAZOLE: SIGNIFICANT CHANGE UP
CULTURE RESULTS: ABNORMAL
METHOD TYPE: SIGNIFICANT CHANGE UP
ORGANISM # SPEC MICROSCOPIC CNT: ABNORMAL
ORGANISM # SPEC MICROSCOPIC CNT: ABNORMAL
SPECIMEN SOURCE: SIGNIFICANT CHANGE UP